# Patient Record
Sex: FEMALE | Race: OTHER | HISPANIC OR LATINO | URBAN - METROPOLITAN AREA
[De-identification: names, ages, dates, MRNs, and addresses within clinical notes are randomized per-mention and may not be internally consistent; named-entity substitution may affect disease eponyms.]

---

## 2018-04-02 ENCOUNTER — EMERGENCY (EMERGENCY)
Facility: HOSPITAL | Age: 38
LOS: 1 days | Discharge: HOME | End: 2018-04-02
Attending: EMERGENCY MEDICINE
Payer: MEDICAID

## 2018-04-02 VITALS
SYSTOLIC BLOOD PRESSURE: 99 MMHG | OXYGEN SATURATION: 100 % | TEMPERATURE: 97 F | RESPIRATION RATE: 18 BRPM | DIASTOLIC BLOOD PRESSURE: 66 MMHG | HEART RATE: 63 BPM

## 2018-04-02 VITALS
OXYGEN SATURATION: 98 % | DIASTOLIC BLOOD PRESSURE: 75 MMHG | HEIGHT: 64 IN | WEIGHT: 149.03 LBS | RESPIRATION RATE: 16 BRPM | HEART RATE: 65 BPM | TEMPERATURE: 98 F | SYSTOLIC BLOOD PRESSURE: 115 MMHG

## 2018-04-02 PROBLEM — Z00.00 ENCOUNTER FOR PREVENTIVE HEALTH EXAMINATION: Status: ACTIVE | Noted: 2018-04-02

## 2018-04-02 LAB — HCG UR QL: NEGATIVE — SIGNIFICANT CHANGE UP

## 2018-04-02 PROCEDURE — 71046 X-RAY EXAM CHEST 2 VIEWS: CPT

## 2018-04-02 PROCEDURE — 81025 URINE PREGNANCY TEST: CPT

## 2018-04-02 PROCEDURE — 99284 EMERGENCY DEPT VISIT MOD MDM: CPT

## 2018-04-02 PROCEDURE — 99283 EMERGENCY DEPT VISIT LOW MDM: CPT | Mod: 25

## 2018-04-02 PROCEDURE — 71046 X-RAY EXAM CHEST 2 VIEWS: CPT | Mod: 26

## 2018-04-02 PROCEDURE — 93005 ELECTROCARDIOGRAM TRACING: CPT

## 2018-04-02 RX ORDER — IBUPROFEN 200 MG
800 TABLET ORAL ONCE
Qty: 0 | Refills: 0 | Status: COMPLETED | OUTPATIENT
Start: 2018-04-02 | End: 2018-04-02

## 2018-04-02 RX ADMIN — Medication 800 MILLIGRAM(S): at 21:53

## 2018-04-02 NOTE — ED PROVIDER NOTE - PROGRESS NOTE DETAILS
Audrey LOPEZ: Patient reassessed and reports feeling better. On exam her L chest/breast is nontender. Patient will follow up with her PMD in 3 days.

## 2018-04-02 NOTE — ED PROVIDER NOTE - OBJECTIVE STATEMENT
Gurjit LOPEZ: 36 y/o female w/o PMH here with L breast pain. Patient reports one day of pain in her breast that is severe and acute. Pain is not worsened or improved by anything. Patient denies fever, cough, palpitations, BELLAMY, Orthopnea, back pain, paresthesias, LE edema, travel or hemoptysis. Exam shows a female in NAD with clear lungs and cardiac S1S2 ntoed, RRR. Abd soft and nontender and nondistended. L breast without galactorrhea and no LAD noted in the axillary area or supraclavicular region. Skin overlying her breast is normal and w/o erythema. Consider CAP, Connective tissue disease, Muscular pain. Plan EKG, CXR and pain control and reassess.

## 2019-11-27 ENCOUNTER — EMERGENCY (EMERGENCY)
Facility: HOSPITAL | Age: 39
LOS: 1 days | Discharge: HOME | End: 2019-11-27
Attending: EMERGENCY MEDICINE
Payer: MEDICAID

## 2019-11-27 VITALS
RESPIRATION RATE: 17 BRPM | HEIGHT: 64 IN | WEIGHT: 145.95 LBS | DIASTOLIC BLOOD PRESSURE: 79 MMHG | HEART RATE: 81 BPM | TEMPERATURE: 98 F | SYSTOLIC BLOOD PRESSURE: 114 MMHG | OXYGEN SATURATION: 98 %

## 2019-11-27 PROCEDURE — 99285 EMERGENCY DEPT VISIT HI MDM: CPT

## 2019-11-27 RX ORDER — SODIUM CHLORIDE 9 MG/ML
1000 INJECTION INTRAMUSCULAR; INTRAVENOUS; SUBCUTANEOUS ONCE
Refills: 0 | Status: COMPLETED | OUTPATIENT
Start: 2019-11-27 | End: 2019-11-27

## 2019-11-27 RX ORDER — KETOROLAC TROMETHAMINE 30 MG/ML
30 SYRINGE (ML) INJECTION ONCE
Refills: 0 | Status: DISCONTINUED | OUTPATIENT
Start: 2019-11-27 | End: 2019-11-27

## 2019-11-28 VITALS
RESPIRATION RATE: 16 BRPM | HEART RATE: 74 BPM | OXYGEN SATURATION: 98 % | DIASTOLIC BLOOD PRESSURE: 54 MMHG | SYSTOLIC BLOOD PRESSURE: 100 MMHG | TEMPERATURE: 98 F

## 2019-11-28 LAB
ALBUMIN SERPL ELPH-MCNC: 3.3 G/DL — LOW (ref 3.5–5)
ALP SERPL-CCNC: 58 U/L — SIGNIFICANT CHANGE UP (ref 40–120)
ALT FLD-CCNC: 28 U/L DA — SIGNIFICANT CHANGE UP (ref 10–60)
ANION GAP SERPL CALC-SCNC: 4 MMOL/L — LOW (ref 5–17)
APPEARANCE UR: CLEAR — SIGNIFICANT CHANGE UP
APTT BLD: 32 SEC — SIGNIFICANT CHANGE UP (ref 27.5–36.3)
AST SERPL-CCNC: 14 U/L — SIGNIFICANT CHANGE UP (ref 10–40)
BASOPHILS # BLD AUTO: 0.01 K/UL — SIGNIFICANT CHANGE UP (ref 0–0.2)
BASOPHILS NFR BLD AUTO: 0.1 % — SIGNIFICANT CHANGE UP (ref 0–2)
BILIRUB SERPL-MCNC: 0.3 MG/DL — SIGNIFICANT CHANGE UP (ref 0.2–1.2)
BILIRUB UR-MCNC: NEGATIVE — SIGNIFICANT CHANGE UP
BUN SERPL-MCNC: 9 MG/DL — SIGNIFICANT CHANGE UP (ref 7–18)
CALCIUM SERPL-MCNC: 8.6 MG/DL — SIGNIFICANT CHANGE UP (ref 8.4–10.5)
CHLORIDE SERPL-SCNC: 108 MMOL/L — SIGNIFICANT CHANGE UP (ref 96–108)
CO2 SERPL-SCNC: 27 MMOL/L — SIGNIFICANT CHANGE UP (ref 22–31)
COLOR SPEC: YELLOW — SIGNIFICANT CHANGE UP
CREAT SERPL-MCNC: 0.67 MG/DL — SIGNIFICANT CHANGE UP (ref 0.5–1.3)
DIFF PNL FLD: NEGATIVE — SIGNIFICANT CHANGE UP
EOSINOPHIL # BLD AUTO: 0.05 K/UL — SIGNIFICANT CHANGE UP (ref 0–0.5)
EOSINOPHIL NFR BLD AUTO: 0.6 % — SIGNIFICANT CHANGE UP (ref 0–6)
GLUCOSE SERPL-MCNC: 90 MG/DL — SIGNIFICANT CHANGE UP (ref 70–99)
GLUCOSE UR QL: NEGATIVE — SIGNIFICANT CHANGE UP
HCG SERPL-ACNC: 1060 MIU/ML — HIGH
HCG UR QL: POSITIVE
HCT VFR BLD CALC: 35.9 % — SIGNIFICANT CHANGE UP (ref 34.5–45)
HGB BLD-MCNC: 11.8 G/DL — SIGNIFICANT CHANGE UP (ref 11.5–15.5)
IMM GRANULOCYTES NFR BLD AUTO: 0.2 % — SIGNIFICANT CHANGE UP (ref 0–1.5)
INR BLD: 1.04 RATIO — SIGNIFICANT CHANGE UP (ref 0.88–1.16)
KETONES UR-MCNC: NEGATIVE — SIGNIFICANT CHANGE UP
LACTATE SERPL-SCNC: 1.2 MMOL/L — SIGNIFICANT CHANGE UP (ref 0.7–2)
LEUKOCYTE ESTERASE UR-ACNC: NEGATIVE — SIGNIFICANT CHANGE UP
LYMPHOCYTES # BLD AUTO: 3.11 K/UL — SIGNIFICANT CHANGE UP (ref 1–3.3)
LYMPHOCYTES # BLD AUTO: 36.4 % — SIGNIFICANT CHANGE UP (ref 13–44)
MCHC RBC-ENTMCNC: 29.1 PG — SIGNIFICANT CHANGE UP (ref 27–34)
MCHC RBC-ENTMCNC: 32.9 GM/DL — SIGNIFICANT CHANGE UP (ref 32–36)
MCV RBC AUTO: 88.4 FL — SIGNIFICANT CHANGE UP (ref 80–100)
MONOCYTES # BLD AUTO: 0.61 K/UL — SIGNIFICANT CHANGE UP (ref 0–0.9)
MONOCYTES NFR BLD AUTO: 7.1 % — SIGNIFICANT CHANGE UP (ref 2–14)
NEUTROPHILS # BLD AUTO: 4.75 K/UL — SIGNIFICANT CHANGE UP (ref 1.8–7.4)
NEUTROPHILS NFR BLD AUTO: 55.6 % — SIGNIFICANT CHANGE UP (ref 43–77)
NITRITE UR-MCNC: NEGATIVE — SIGNIFICANT CHANGE UP
NRBC # BLD: 0 /100 WBCS — SIGNIFICANT CHANGE UP (ref 0–0)
PH UR: 8 — SIGNIFICANT CHANGE UP (ref 5–8)
PLATELET # BLD AUTO: 236 K/UL — SIGNIFICANT CHANGE UP (ref 150–400)
POTASSIUM SERPL-MCNC: 3.4 MMOL/L — LOW (ref 3.5–5.3)
POTASSIUM SERPL-SCNC: 3.4 MMOL/L — LOW (ref 3.5–5.3)
PROT SERPL-MCNC: 6.6 G/DL — SIGNIFICANT CHANGE UP (ref 6–8.3)
PROT UR-MCNC: NEGATIVE — SIGNIFICANT CHANGE UP
PROTHROM AB SERPL-ACNC: 11.6 SEC — SIGNIFICANT CHANGE UP (ref 10–12.9)
RBC # BLD: 4.06 M/UL — SIGNIFICANT CHANGE UP (ref 3.8–5.2)
RBC # FLD: 13.1 % — SIGNIFICANT CHANGE UP (ref 10.3–14.5)
SODIUM SERPL-SCNC: 139 MMOL/L — SIGNIFICANT CHANGE UP (ref 135–145)
SP GR SPEC: 1.01 — SIGNIFICANT CHANGE UP (ref 1.01–1.02)
TROPONIN I SERPL-MCNC: <0.015 NG/ML — SIGNIFICANT CHANGE UP (ref 0–0.04)
UROBILINOGEN FLD QL: NEGATIVE — SIGNIFICANT CHANGE UP
WBC # BLD: 8.55 K/UL — SIGNIFICANT CHANGE UP (ref 3.8–10.5)
WBC # FLD AUTO: 8.55 K/UL — SIGNIFICANT CHANGE UP (ref 3.8–10.5)

## 2019-11-28 PROCEDURE — 85610 PROTHROMBIN TIME: CPT

## 2019-11-28 PROCEDURE — 83605 ASSAY OF LACTIC ACID: CPT

## 2019-11-28 PROCEDURE — 96361 HYDRATE IV INFUSION ADD-ON: CPT

## 2019-11-28 PROCEDURE — 76856 US EXAM PELVIC COMPLETE: CPT

## 2019-11-28 PROCEDURE — 86850 RBC ANTIBODY SCREEN: CPT

## 2019-11-28 PROCEDURE — 99284 EMERGENCY DEPT VISIT MOD MDM: CPT | Mod: 25

## 2019-11-28 PROCEDURE — 86900 BLOOD TYPING SEROLOGIC ABO: CPT

## 2019-11-28 PROCEDURE — 84484 ASSAY OF TROPONIN QUANT: CPT

## 2019-11-28 PROCEDURE — 87086 URINE CULTURE/COLONY COUNT: CPT

## 2019-11-28 PROCEDURE — 86901 BLOOD TYPING SEROLOGIC RH(D): CPT

## 2019-11-28 PROCEDURE — 84702 CHORIONIC GONADOTROPIN TEST: CPT

## 2019-11-28 PROCEDURE — 81003 URINALYSIS AUTO W/O SCOPE: CPT

## 2019-11-28 PROCEDURE — 76830 TRANSVAGINAL US NON-OB: CPT | Mod: 26

## 2019-11-28 PROCEDURE — 36415 COLL VENOUS BLD VENIPUNCTURE: CPT

## 2019-11-28 PROCEDURE — 85730 THROMBOPLASTIN TIME PARTIAL: CPT

## 2019-11-28 PROCEDURE — 80053 COMPREHEN METABOLIC PANEL: CPT

## 2019-11-28 PROCEDURE — 85027 COMPLETE CBC AUTOMATED: CPT

## 2019-11-28 PROCEDURE — 81025 URINE PREGNANCY TEST: CPT

## 2019-11-28 PROCEDURE — 76830 TRANSVAGINAL US NON-OB: CPT

## 2019-11-28 PROCEDURE — 76856 US EXAM PELVIC COMPLETE: CPT | Mod: 26

## 2019-11-28 PROCEDURE — 82962 GLUCOSE BLOOD TEST: CPT

## 2019-11-28 PROCEDURE — 96374 THER/PROPH/DIAG INJ IV PUSH: CPT

## 2019-11-28 PROCEDURE — 96375 TX/PRO/DX INJ NEW DRUG ADDON: CPT

## 2019-11-28 PROCEDURE — 93005 ELECTROCARDIOGRAM TRACING: CPT

## 2019-11-28 RX ORDER — MECLIZINE HCL 12.5 MG
1 TABLET ORAL
Qty: 9 | Refills: 0
Start: 2019-11-28 | End: 2019-11-30

## 2019-11-28 RX ORDER — MECLIZINE HCL 12.5 MG
50 TABLET ORAL ONCE
Refills: 0 | Status: COMPLETED | OUTPATIENT
Start: 2019-11-28 | End: 2019-11-28

## 2019-11-28 RX ORDER — ONDANSETRON 8 MG/1
4 TABLET, FILM COATED ORAL ONCE
Refills: 0 | Status: COMPLETED | OUTPATIENT
Start: 2019-11-28 | End: 2019-11-28

## 2019-11-28 RX ORDER — ACETAMINOPHEN 500 MG
1000 TABLET ORAL ONCE
Refills: 0 | Status: COMPLETED | OUTPATIENT
Start: 2019-11-28 | End: 2019-11-28

## 2019-11-28 RX ADMIN — SODIUM CHLORIDE 1000 MILLILITER(S): 9 INJECTION INTRAMUSCULAR; INTRAVENOUS; SUBCUTANEOUS at 00:11

## 2019-11-28 RX ADMIN — Medication 50 MILLIGRAM(S): at 02:26

## 2019-11-28 RX ADMIN — SODIUM CHLORIDE 1000 MILLILITER(S): 9 INJECTION INTRAMUSCULAR; INTRAVENOUS; SUBCUTANEOUS at 02:24

## 2019-11-28 RX ADMIN — Medication 400 MILLIGRAM(S): at 02:28

## 2019-11-28 RX ADMIN — ONDANSETRON 4 MILLIGRAM(S): 8 TABLET, FILM COATED ORAL at 02:26

## 2019-11-28 NOTE — ED PROVIDER NOTE - CLINICAL SUMMARY MEDICAL DECISION MAKING FREE TEXT BOX
39 year old female with abd pain and dizziness. vitals WNL. PE as above.  labs are significant for elevated HCG. US pelvis without LIUP, but likely early pregnancy. pain in ED resolved with tylenol. advised to return to the ED in 2 days for repeat US and HCG. return precautions given.

## 2019-11-28 NOTE — ED PROVIDER NOTE - PATIENT PORTAL LINK FT
You can access the FollowMyHealth Patient Portal offered by [facility] by registering at the following website: [email or web address]. By joining [employer]’s FollowMyHealth portal, you will also be able to view your health information using other applications (apps) compatible with our system.

## 2019-11-28 NOTE — ED ADULT NURSE NOTE - NSIMPLEMENTINTERV_GEN_ALL_ED
Implemented All Universal Safety Interventions:  [place] to call system. Call bell, personal items and telephone within reach. Instruct patient to call for assistance. Room bathroom lighting operational. Non-slip footwear when patient is off stretcher. Physically safe environment: no spills, clutter or unnecessary equipment. Stretcher in lowest position, wheels locked, appropriate side rails in place.

## 2019-11-28 NOTE — ED PROVIDER NOTE - OBJECTIVE STATEMENT
39 year old female denies PMH coming in with lower abd pains, ha, and dizziness for the past few days. states today pains were worse and she had episode of syncope, states no trauma or fall and was in bed when it happened. States dizziness worse with movement. Denies fevers, chills, sweats, cp, sob, palpitations, back pains, V/D/C< urinary complaints, vaginal bleeding, vaginal discharge.

## 2019-11-29 LAB
CULTURE RESULTS: SIGNIFICANT CHANGE UP
SPECIMEN SOURCE: SIGNIFICANT CHANGE UP

## 2019-11-30 ENCOUNTER — EMERGENCY (EMERGENCY)
Facility: HOSPITAL | Age: 39
LOS: 1 days | Discharge: HOME | End: 2019-11-30
Attending: EMERGENCY MEDICINE
Payer: MEDICAID

## 2019-11-30 VITALS
RESPIRATION RATE: 20 BRPM | SYSTOLIC BLOOD PRESSURE: 110 MMHG | DIASTOLIC BLOOD PRESSURE: 68 MMHG | WEIGHT: 145.95 LBS | TEMPERATURE: 97 F | OXYGEN SATURATION: 99 % | HEIGHT: 64 IN | HEART RATE: 95 BPM

## 2019-11-30 VITALS — DIASTOLIC BLOOD PRESSURE: 53 MMHG | SYSTOLIC BLOOD PRESSURE: 93 MMHG

## 2019-11-30 LAB — HCG SERPL-ACNC: 2139 MIU/ML — HIGH

## 2019-11-30 PROCEDURE — 76815 OB US LIMITED FETUS(S): CPT | Mod: 26

## 2019-11-30 PROCEDURE — 76801 OB US < 14 WKS SINGLE FETUS: CPT

## 2019-11-30 PROCEDURE — 76817 TRANSVAGINAL US OBSTETRIC: CPT

## 2019-11-30 PROCEDURE — 99284 EMERGENCY DEPT VISIT MOD MDM: CPT

## 2019-11-30 PROCEDURE — 84702 CHORIONIC GONADOTROPIN TEST: CPT

## 2019-11-30 PROCEDURE — 76801 OB US < 14 WKS SINGLE FETUS: CPT | Mod: 26

## 2019-11-30 PROCEDURE — 36415 COLL VENOUS BLD VENIPUNCTURE: CPT

## 2019-11-30 PROCEDURE — 99284 EMERGENCY DEPT VISIT MOD MDM: CPT | Mod: 25

## 2019-11-30 PROCEDURE — 76817 TRANSVAGINAL US OBSTETRIC: CPT | Mod: 26

## 2019-11-30 RX ORDER — MECLIZINE HCL 12.5 MG
1 TABLET ORAL
Qty: 9 | Refills: 0
Start: 2019-11-30 | End: 2019-12-02

## 2019-11-30 RX ORDER — MECLIZINE HCL 12.5 MG
25 TABLET ORAL ONCE
Refills: 0 | Status: COMPLETED | OUTPATIENT
Start: 2019-11-30 | End: 2019-11-30

## 2019-11-30 NOTE — ED ADULT NURSE NOTE - NSIMPLEMENTINTERV_GEN_ALL_ED
Implemented All Universal Safety Interventions:  [place] to call system. Call bell, personal items and telephone within reach. Instruct patient to call for assistance. Room bathroom lighting operational. Non-slip footwear when patient is off stretcher. Physically safe environment: no spills, clutter or unnecessary equipment. Stretcher in lowest position, wheels locked, appropriate side rails in place.

## 2019-11-30 NOTE — ED ADULT NURSE NOTE - OBJECTIVE STATEMENT
Patient came to the ED a/o x 3 ambulates sent back for repeat hcg. Complains of slight dizziness, no CP/ SOB.

## 2019-11-30 NOTE — ED PROVIDER NOTE - CLINICAL SUMMARY MEDICAL DECISION MAKING FREE TEXT BOX
Patient here for repeat ACG check. 2 days ago was 1060 with ultrasound showing no gestational sac or IUP. Will repeat ACG to rule out ectopic vs early vs miss. Will contact GYN for follow-up.

## 2019-11-30 NOTE — ED PROVIDER NOTE - PATIENT PORTAL LINK FT
You can access the FollowMyHealth Patient Portal offered by [facility] by registering at the following website: [email or web address]. By joining [employer]’s FollowMyHealth portal, you will also be able to view your health information using other applications (apps) compatible with our system.

## 2019-11-30 NOTE — ED PROVIDER NOTE - PROGRESS NOTE DETAILS
Parsons: sono shows likely early gestational sac in uterus. and hcg double.  spoke with ob and rec to follow up with clinic number [phone]

## 2019-11-30 NOTE — ED PROVIDER NOTE - OBJECTIVE STATEMENT
39 year old female with PMHx of gastritis and no pertinent PSHx presents to the ED with complaints of dizziness and improved mid lower pelvic pain. Patient reports that two days ago she was seen in this hospital, and was found to be pregnant. Patient states that she wants to keep the pregnancy, and was not given prenatal vitamins during her previous visit here. Patient describes her dizziness as a room-spinning sensation. Patient is requesting to transfer care to this hospital, and to find a OB-GYN here. Patient otherwise denies any vaginal bleeding and all other acute complaints. NKDA.

## 2019-12-10 PROBLEM — K29.70 GASTRITIS, UNSPECIFIED, WITHOUT BLEEDING: Chronic | Status: ACTIVE | Noted: 2019-11-30

## 2019-12-17 ENCOUNTER — OUTPATIENT (OUTPATIENT)
Dept: OUTPATIENT SERVICES | Facility: HOSPITAL | Age: 39
LOS: 1 days | End: 2019-12-17
Payer: MEDICAID

## 2019-12-17 ENCOUNTER — NON-APPOINTMENT (OUTPATIENT)
Age: 39
End: 2019-12-17

## 2019-12-17 ENCOUNTER — RESULT REVIEW (OUTPATIENT)
Age: 39
End: 2019-12-17

## 2019-12-17 ENCOUNTER — APPOINTMENT (OUTPATIENT)
Dept: OBGYN | Facility: CLINIC | Age: 39
End: 2019-12-17
Payer: MEDICAID

## 2019-12-17 VITALS
SYSTOLIC BLOOD PRESSURE: 97 MMHG | BODY MASS INDEX: 24.75 KG/M2 | DIASTOLIC BLOOD PRESSURE: 65 MMHG | WEIGHT: 145 LBS | HEIGHT: 64 IN

## 2019-12-17 DIAGNOSIS — Z34.00 ENCOUNTER FOR SUPERVISION OF NORMAL FIRST PREGNANCY, UNSPECIFIED TRIMESTER: ICD-10-CM

## 2019-12-17 DIAGNOSIS — O09.519 SUPERVISION OF ELDERLY PRIMIGRAVIDA, UNSPECIFIED TRIMESTER: ICD-10-CM

## 2019-12-17 DIAGNOSIS — O99.280 ENDOCRINE, NUTRITIONAL AND METABOLIC DISEASES COMPLICATING PREGNANCY, UNSPECIFIED TRIMESTER: ICD-10-CM

## 2019-12-17 DIAGNOSIS — Z78.9 OTHER SPECIFIED HEALTH STATUS: ICD-10-CM

## 2019-12-17 DIAGNOSIS — E03.9 ENDOCRINE, NUTRITIONAL AND METABOLIC DISEASES COMPLICATING PREGNANCY, UNSPECIFIED TRIMESTER: ICD-10-CM

## 2019-12-17 DIAGNOSIS — Z3A.08 8 WEEKS GESTATION OF PREGNANCY: ICD-10-CM

## 2019-12-17 DIAGNOSIS — Z11.3 ENCOUNTER FOR SCREENING FOR INFECTIONS WITH A PREDOMINANTLY SEXUAL MODE OF TRANSMISSION: ICD-10-CM

## 2019-12-17 DIAGNOSIS — Z34.91 ENCOUNTER FOR SUPERVISION OF NORMAL PREGNANCY, UNSPECIFIED, FIRST TRIMESTER: ICD-10-CM

## 2019-12-17 DIAGNOSIS — Z80.3 FAMILY HISTORY OF MALIGNANT NEOPLASM OF BREAST: ICD-10-CM

## 2019-12-17 PROCEDURE — 87389 HIV-1 AG W/HIV-1&-2 AB AG IA: CPT

## 2019-12-17 PROCEDURE — 81220 CFTR GENE COM VARIANTS: CPT

## 2019-12-17 PROCEDURE — 87086 URINE CULTURE/COLONY COUNT: CPT

## 2019-12-17 PROCEDURE — 83655 ASSAY OF LEAD: CPT

## 2019-12-17 PROCEDURE — 81025 URINE PREGNANCY TEST: CPT

## 2019-12-17 PROCEDURE — 87624 HPV HI-RISK TYP POOLED RSLT: CPT

## 2019-12-17 PROCEDURE — 86803 HEPATITIS C AB TEST: CPT

## 2019-12-17 PROCEDURE — 84439 ASSAY OF FREE THYROXINE: CPT

## 2019-12-17 PROCEDURE — 83036 HEMOGLOBIN GLYCOSYLATED A1C: CPT

## 2019-12-17 PROCEDURE — 86901 BLOOD TYPING SEROLOGIC RH(D): CPT

## 2019-12-17 PROCEDURE — 83020 HEMOGLOBIN ELECTROPHORESIS: CPT

## 2019-12-17 PROCEDURE — 86850 RBC ANTIBODY SCREEN: CPT

## 2019-12-17 PROCEDURE — 86762 RUBELLA ANTIBODY: CPT

## 2019-12-17 PROCEDURE — 86900 BLOOD TYPING SEROLOGIC ABO: CPT

## 2019-12-17 PROCEDURE — 84481 FREE ASSAY (FT-3): CPT

## 2019-12-17 PROCEDURE — 81003 URINALYSIS AUTO W/O SCOPE: CPT

## 2019-12-17 PROCEDURE — 99203 OFFICE O/P NEW LOW 30 MIN: CPT

## 2019-12-17 PROCEDURE — 86787 VARICELLA-ZOSTER ANTIBODY: CPT

## 2019-12-17 PROCEDURE — 86780 TREPONEMA PALLIDUM: CPT

## 2019-12-17 PROCEDURE — 86480 TB TEST CELL IMMUN MEASURE: CPT

## 2019-12-17 PROCEDURE — 87340 HEPATITIS B SURFACE AG IA: CPT

## 2019-12-17 PROCEDURE — G0463: CPT

## 2019-12-17 PROCEDURE — 84443 ASSAY THYROID STIM HORMONE: CPT

## 2019-12-17 PROCEDURE — 83020 HEMOGLOBIN ELECTROPHORESIS: CPT | Mod: 26

## 2019-12-17 PROCEDURE — 86765 RUBEOLA ANTIBODY: CPT

## 2019-12-17 RX ORDER — PNV/FERROUS SULFATE/FOLIC ACID 27-<0.5MG
TABLET ORAL
Refills: 0 | Status: COMPLETED | COMMUNITY

## 2019-12-18 DIAGNOSIS — Z11.3 ENCOUNTER FOR SCREENING FOR INFECTIONS WITH A PREDOMINANTLY SEXUAL MODE OF TRANSMISSION: ICD-10-CM

## 2019-12-18 DIAGNOSIS — Z3A.08 8 WEEKS GESTATION OF PREGNANCY: ICD-10-CM

## 2019-12-18 DIAGNOSIS — O09.519 SUPERVISION OF ELDERLY PRIMIGRAVIDA, UNSPECIFIED TRIMESTER: ICD-10-CM

## 2019-12-18 PROBLEM — O99.280 HYPOTHYROIDISM AFFECTING PREGNANCY, ANTEPARTUM: Status: ACTIVE | Noted: 2019-12-18

## 2019-12-18 LAB
APPEARANCE UR: CLEAR — SIGNIFICANT CHANGE UP
BASOPHILS # BLD AUTO: 0.01 K/UL — SIGNIFICANT CHANGE UP (ref 0–0.2)
BASOPHILS NFR BLD AUTO: 0.1 % — SIGNIFICANT CHANGE UP (ref 0–2)
BILIRUB UR-MCNC: NEGATIVE — SIGNIFICANT CHANGE UP
COLOR SPEC: SIGNIFICANT CHANGE UP
DIFF PNL FLD: NEGATIVE — SIGNIFICANT CHANGE UP
EOSINOPHIL # BLD AUTO: 0.01 K/UL — SIGNIFICANT CHANGE UP (ref 0–0.5)
EOSINOPHIL NFR BLD AUTO: 0.1 % — SIGNIFICANT CHANGE UP (ref 0–6)
GLUCOSE UR QL: NEGATIVE — SIGNIFICANT CHANGE UP
HBA1C BLD-MCNC: 5.3 % — SIGNIFICANT CHANGE UP (ref 4–5.6)
HBV SURFACE AG SER-ACNC: SIGNIFICANT CHANGE UP
HCT VFR BLD CALC: 39.2 % — SIGNIFICANT CHANGE UP (ref 34.5–45)
HCV AB S/CO SERPL IA: 0.15 S/CO — SIGNIFICANT CHANGE UP (ref 0–0.99)
HCV AB SERPL-IMP: SIGNIFICANT CHANGE UP
HGB BLD-MCNC: 12.8 G/DL — SIGNIFICANT CHANGE UP (ref 11.5–15.5)
HIV 1+2 AB+HIV1 P24 AG SERPL QL IA: SIGNIFICANT CHANGE UP
IMM GRANULOCYTES NFR BLD AUTO: 0.3 % — SIGNIFICANT CHANGE UP (ref 0–1.5)
KETONES UR-MCNC: NEGATIVE — SIGNIFICANT CHANGE UP
LEUKOCYTE ESTERASE UR-ACNC: NEGATIVE — SIGNIFICANT CHANGE UP
LYMPHOCYTES # BLD AUTO: 2.27 K/UL — SIGNIFICANT CHANGE UP (ref 1–3.3)
LYMPHOCYTES # BLD AUTO: 33.3 % — SIGNIFICANT CHANGE UP (ref 13–44)
MCHC RBC-ENTMCNC: 29.4 PG — SIGNIFICANT CHANGE UP (ref 27–34)
MCHC RBC-ENTMCNC: 32.7 GM/DL — SIGNIFICANT CHANGE UP (ref 32–36)
MCV RBC AUTO: 90.1 FL — SIGNIFICANT CHANGE UP (ref 80–100)
MEV IGG SER-ACNC: 132 AU/ML — SIGNIFICANT CHANGE UP
MEV IGG+IGM SER-IMP: POSITIVE — SIGNIFICANT CHANGE UP
MONOCYTES # BLD AUTO: 0.35 K/UL — SIGNIFICANT CHANGE UP (ref 0–0.9)
MONOCYTES NFR BLD AUTO: 5.1 % — SIGNIFICANT CHANGE UP (ref 2–14)
NEUTROPHILS # BLD AUTO: 4.15 K/UL — SIGNIFICANT CHANGE UP (ref 1.8–7.4)
NEUTROPHILS NFR BLD AUTO: 61.1 % — SIGNIFICANT CHANGE UP (ref 43–77)
NITRITE UR-MCNC: NEGATIVE — SIGNIFICANT CHANGE UP
PH UR: 6 — SIGNIFICANT CHANGE UP (ref 5–8)
PLATELET # BLD AUTO: 233 K/UL — SIGNIFICANT CHANGE UP (ref 150–400)
PROT UR-MCNC: SIGNIFICANT CHANGE UP
RBC # BLD: 4.35 M/UL — SIGNIFICANT CHANGE UP (ref 3.8–5.2)
RBC # FLD: 13.2 % — SIGNIFICANT CHANGE UP (ref 10.3–14.5)
RUBV IGG SER-ACNC: 1.9 INDEX — SIGNIFICANT CHANGE UP
RUBV IGG SER-IMP: POSITIVE — SIGNIFICANT CHANGE UP
SP GR SPEC: 1.02 — SIGNIFICANT CHANGE UP (ref 1.01–1.02)
T PALLIDUM AB TITR SER: NEGATIVE — SIGNIFICANT CHANGE UP
T3FREE SERPL-MCNC: 2.72 PG/ML — SIGNIFICANT CHANGE UP (ref 1.8–4.6)
T4 FREE SERPL-MCNC: 1 NG/DL — SIGNIFICANT CHANGE UP (ref 0.9–1.8)
TSH SERPL-MCNC: 4.79 UIU/ML — HIGH (ref 0.27–4.2)
UROBILINOGEN FLD QL: SIGNIFICANT CHANGE UP
VZV IGG FLD QL IA: 1484 INDEX — SIGNIFICANT CHANGE UP
VZV IGG FLD QL IA: POSITIVE — SIGNIFICANT CHANGE UP
WBC # BLD: 6.81 K/UL — SIGNIFICANT CHANGE UP (ref 3.8–10.5)
WBC # FLD AUTO: 6.81 K/UL — SIGNIFICANT CHANGE UP (ref 3.8–10.5)

## 2019-12-18 RX ORDER — LEVOTHYROXINE SODIUM 0.05 MG/1
50 TABLET ORAL DAILY
Qty: 30 | Refills: 6 | Status: ACTIVE | COMMUNITY
Start: 2019-12-18 | End: 1900-01-01

## 2019-12-18 RX ORDER — PRENATAL VIT NO.130/IRON/FOLIC 27MG-0.8MG
28-0.8 TABLET ORAL
Qty: 30 | Refills: 11 | Status: ACTIVE | COMMUNITY
Start: 2019-12-18 | End: 1900-01-01

## 2019-12-19 LAB
CULTURE RESULTS: NO GROWTH — SIGNIFICANT CHANGE UP
GAMMA INTERFERON BACKGROUND BLD IA-ACNC: 0.07 IU/ML — SIGNIFICANT CHANGE UP
HEMOGLOBIN INTERPRETATION: SIGNIFICANT CHANGE UP
HGB A MFR BLD: 97.4 % — SIGNIFICANT CHANGE UP (ref 95.8–98)
HGB A2 MFR BLD: 2.6 % — SIGNIFICANT CHANGE UP (ref 2–3.2)
LEAD BLD-MCNC: SIGNIFICANT CHANGE UP UG/DL (ref 0–4)
M TB IFN-G BLD-IMP: NEGATIVE — SIGNIFICANT CHANGE UP
M TB IFN-G CD4+ BCKGRND COR BLD-ACNC: -0.04 IU/ML — SIGNIFICANT CHANGE UP
M TB IFN-G CD4+CD8+ BCKGRND COR BLD-ACNC: -0.03 IU/ML — SIGNIFICANT CHANGE UP
QUANT TB PLUS MITOGEN MINUS NIL: 8.7 IU/ML — SIGNIFICANT CHANGE UP
SPECIMEN SOURCE: SIGNIFICANT CHANGE UP

## 2019-12-23 LAB — CYSTIC FIBROSIS EXPANDED PANEL: SIGNIFICANT CHANGE UP

## 2020-01-02 ENCOUNTER — APPOINTMENT (OUTPATIENT)
Dept: ANTEPARTUM | Facility: CLINIC | Age: 40
End: 2020-01-02
Payer: MEDICAID

## 2020-01-02 ENCOUNTER — ASOB RESULT (OUTPATIENT)
Age: 40
End: 2020-01-02

## 2020-01-02 PROCEDURE — 76801 OB US < 14 WKS SINGLE FETUS: CPT

## 2020-01-02 PROCEDURE — 76817 TRANSVAGINAL US OBSTETRIC: CPT

## 2020-01-09 ENCOUNTER — EMERGENCY (EMERGENCY)
Facility: HOSPITAL | Age: 40
LOS: 1 days | Discharge: HOME | End: 2020-01-09
Attending: EMERGENCY MEDICINE
Payer: MEDICAID

## 2020-01-09 VITALS
DIASTOLIC BLOOD PRESSURE: 72 MMHG | HEART RATE: 92 BPM | WEIGHT: 145.95 LBS | TEMPERATURE: 98 F | SYSTOLIC BLOOD PRESSURE: 103 MMHG | HEIGHT: 64 IN | RESPIRATION RATE: 18 BRPM | OXYGEN SATURATION: 99 %

## 2020-01-09 LAB
ALBUMIN SERPL ELPH-MCNC: 3.5 G/DL — SIGNIFICANT CHANGE UP (ref 3.5–5)
ALP SERPL-CCNC: 63 U/L — SIGNIFICANT CHANGE UP (ref 40–120)
ALT FLD-CCNC: 27 U/L DA — SIGNIFICANT CHANGE UP (ref 10–60)
ANION GAP SERPL CALC-SCNC: 6 MMOL/L — SIGNIFICANT CHANGE UP (ref 5–17)
APPEARANCE UR: CLEAR — SIGNIFICANT CHANGE UP
AST SERPL-CCNC: 16 U/L — SIGNIFICANT CHANGE UP (ref 10–40)
BACTERIA # UR AUTO: ABNORMAL /HPF
BASOPHILS # BLD AUTO: 0.01 K/UL — SIGNIFICANT CHANGE UP (ref 0–0.2)
BASOPHILS NFR BLD AUTO: 0.1 % — SIGNIFICANT CHANGE UP (ref 0–2)
BILIRUB SERPL-MCNC: 0.2 MG/DL — SIGNIFICANT CHANGE UP (ref 0.2–1.2)
BILIRUB UR-MCNC: NEGATIVE — SIGNIFICANT CHANGE UP
BUN SERPL-MCNC: 11 MG/DL — SIGNIFICANT CHANGE UP (ref 7–18)
CALCIUM SERPL-MCNC: 8.3 MG/DL — LOW (ref 8.4–10.5)
CHLORIDE SERPL-SCNC: 109 MMOL/L — HIGH (ref 96–108)
CO2 SERPL-SCNC: 27 MMOL/L — SIGNIFICANT CHANGE UP (ref 22–31)
COLOR SPEC: YELLOW — SIGNIFICANT CHANGE UP
CREAT SERPL-MCNC: 0.76 MG/DL — SIGNIFICANT CHANGE UP (ref 0.5–1.3)
DIFF PNL FLD: ABNORMAL
EOSINOPHIL # BLD AUTO: 0.03 K/UL — SIGNIFICANT CHANGE UP (ref 0–0.5)
EOSINOPHIL NFR BLD AUTO: 0.4 % — SIGNIFICANT CHANGE UP (ref 0–6)
EPI CELLS # UR: ABNORMAL /HPF
GLUCOSE SERPL-MCNC: 83 MG/DL — SIGNIFICANT CHANGE UP (ref 70–99)
GLUCOSE UR QL: NEGATIVE — SIGNIFICANT CHANGE UP
HCG SERPL-ACNC: SIGNIFICANT CHANGE UP MIU/ML
HCT VFR BLD CALC: 36.8 % — SIGNIFICANT CHANGE UP (ref 34.5–45)
HGB BLD-MCNC: 12.1 G/DL — SIGNIFICANT CHANGE UP (ref 11.5–15.5)
IMM GRANULOCYTES NFR BLD AUTO: 0.1 % — SIGNIFICANT CHANGE UP (ref 0–1.5)
KETONES UR-MCNC: NEGATIVE — SIGNIFICANT CHANGE UP
LEUKOCYTE ESTERASE UR-ACNC: NEGATIVE — SIGNIFICANT CHANGE UP
LYMPHOCYTES # BLD AUTO: 2.13 K/UL — SIGNIFICANT CHANGE UP (ref 1–3.3)
LYMPHOCYTES # BLD AUTO: 28.2 % — SIGNIFICANT CHANGE UP (ref 13–44)
MCHC RBC-ENTMCNC: 29.3 PG — SIGNIFICANT CHANGE UP (ref 27–34)
MCHC RBC-ENTMCNC: 32.9 GM/DL — SIGNIFICANT CHANGE UP (ref 32–36)
MCV RBC AUTO: 89.1 FL — SIGNIFICANT CHANGE UP (ref 80–100)
MONOCYTES # BLD AUTO: 0.44 K/UL — SIGNIFICANT CHANGE UP (ref 0–0.9)
MONOCYTES NFR BLD AUTO: 5.8 % — SIGNIFICANT CHANGE UP (ref 2–14)
NEUTROPHILS # BLD AUTO: 4.92 K/UL — SIGNIFICANT CHANGE UP (ref 1.8–7.4)
NEUTROPHILS NFR BLD AUTO: 65.4 % — SIGNIFICANT CHANGE UP (ref 43–77)
NITRITE UR-MCNC: NEGATIVE — SIGNIFICANT CHANGE UP
NRBC # BLD: 0 /100 WBCS — SIGNIFICANT CHANGE UP (ref 0–0)
PH UR: 6.5 — SIGNIFICANT CHANGE UP (ref 5–8)
PLATELET # BLD AUTO: 236 K/UL — SIGNIFICANT CHANGE UP (ref 150–400)
POTASSIUM SERPL-MCNC: 3.7 MMOL/L — SIGNIFICANT CHANGE UP (ref 3.5–5.3)
POTASSIUM SERPL-SCNC: 3.7 MMOL/L — SIGNIFICANT CHANGE UP (ref 3.5–5.3)
PROT SERPL-MCNC: 6.9 G/DL — SIGNIFICANT CHANGE UP (ref 6–8.3)
PROT UR-MCNC: NEGATIVE — SIGNIFICANT CHANGE UP
RBC # BLD: 4.13 M/UL — SIGNIFICANT CHANGE UP (ref 3.8–5.2)
RBC # FLD: 12.9 % — SIGNIFICANT CHANGE UP (ref 10.3–14.5)
RBC CASTS # UR COMP ASSIST: ABNORMAL /HPF (ref 0–2)
SODIUM SERPL-SCNC: 142 MMOL/L — SIGNIFICANT CHANGE UP (ref 135–145)
SP GR SPEC: 1 — LOW (ref 1.01–1.02)
UROBILINOGEN FLD QL: NEGATIVE — SIGNIFICANT CHANGE UP
WBC # BLD: 7.54 K/UL — SIGNIFICANT CHANGE UP (ref 3.8–10.5)
WBC # FLD AUTO: 7.54 K/UL — SIGNIFICANT CHANGE UP (ref 3.8–10.5)
WBC UR QL: SIGNIFICANT CHANGE UP /HPF (ref 0–5)

## 2020-01-09 PROCEDURE — 99284 EMERGENCY DEPT VISIT MOD MDM: CPT | Mod: 25

## 2020-01-09 PROCEDURE — 76815 OB US LIMITED FETUS(S): CPT | Mod: 26

## 2020-01-09 PROCEDURE — 84702 CHORIONIC GONADOTROPIN TEST: CPT

## 2020-01-09 PROCEDURE — 99285 EMERGENCY DEPT VISIT HI MDM: CPT

## 2020-01-09 PROCEDURE — 81001 URINALYSIS AUTO W/SCOPE: CPT

## 2020-01-09 PROCEDURE — 76815 OB US LIMITED FETUS(S): CPT

## 2020-01-09 PROCEDURE — 76817 TRANSVAGINAL US OBSTETRIC: CPT | Mod: 26

## 2020-01-09 PROCEDURE — 85027 COMPLETE CBC AUTOMATED: CPT

## 2020-01-09 PROCEDURE — 76801 OB US < 14 WKS SINGLE FETUS: CPT | Mod: 26

## 2020-01-09 PROCEDURE — 76801 OB US < 14 WKS SINGLE FETUS: CPT

## 2020-01-09 PROCEDURE — 76817 TRANSVAGINAL US OBSTETRIC: CPT

## 2020-01-09 PROCEDURE — 80053 COMPREHEN METABOLIC PANEL: CPT

## 2020-01-09 PROCEDURE — 36415 COLL VENOUS BLD VENIPUNCTURE: CPT

## 2020-01-09 NOTE — ED PROVIDER NOTE - OBJECTIVE STATEMENT
ID# [id number]: 39 year-old female, history of hypothyroidism, , LMP 10/20/2019, presents with cc brown vaginal discharge with spotting and lower abdominal pain since this AM. Gradual onset, continuous, moderate, no alleviating or aggravating factors. Associated with nausea. Denies any fever, chills, viral symptoms, urinary symptoms, back pain or any other complaints. Was seen by OBGYN (unknwon name) on 2019 and was told that it was still early  to see anything on US and was referred for US on 2020.

## 2020-01-09 NOTE — ED PROVIDER NOTE - PROGRESS NOTE DETAILS
ID#[id number] (Lex): Based on LMP patient is 10 weeks pregnant. Based on US patient is 6 weeks pregnant and no FHR. Big discrepancy. Discussed that if patient is 10 weeks pregnant it is most likely missed . If it is in fact 6 weeks like US specify it may be early to see FHR. WIll need to follow up with OBGYN tomorrow. Rh+. No signs of infection or anemia. Pt is well appearing walking with steady gait, stable for discharge and follow up without fail with medical doctor. I had a detailed discussion with the patient and/or guardian regarding the historical points, exam findings, and any diagnostic results supporting the discharge diagnosis. Pt educated on care and need for follow up. Strict return instructions and red flag signs and symptoms discussed with patient. Questions answered. Pt shows understanding of discharge information and agrees to follow.

## 2020-01-09 NOTE — ED PROVIDER NOTE - NSFOLLOWUPINSTRUCTIONS_ED_ALL_ED_FT
[place] un seguimiento con el médico OB GYN mañana sin falta.   Si experimenta síntomas nuevos o que empeoran, puede volver a la leonor de emergencias.    Follow up with the OBGYN tomorrow  If you experience any new or worsening symptoms or if you are concerned you can always come back to the emergency for a re-evaluation.

## 2020-01-09 NOTE — ED PROVIDER NOTE - PATIENT PORTAL LINK FT
You can access the FollowMyHealth Patient Portal offered by [facility] by registering at the following website: [email or web address]. By joining [employer]’s FollowMyHealth portal, you will also be able to view your health information using other applications (apps) compatible with our system.

## 2020-01-09 NOTE — ED ADULT NURSE NOTE - GASTROINTESTINAL WDL
Abdomen soft, nontender, nondistended, bowel sounds present in all 4 quadrants.
report given to transfer center and EMTs

## 2020-01-10 ENCOUNTER — EMERGENCY (EMERGENCY)
Facility: HOSPITAL | Age: 40
LOS: 1 days | Discharge: HOME | End: 2020-01-10
Attending: EMERGENCY MEDICINE
Payer: MEDICAID

## 2020-01-10 ENCOUNTER — RESULT REVIEW (OUTPATIENT)
Age: 40
End: 2020-01-10

## 2020-01-10 VITALS
OXYGEN SATURATION: 99 % | HEART RATE: 81 BPM | RESPIRATION RATE: 18 BRPM | DIASTOLIC BLOOD PRESSURE: 66 MMHG | SYSTOLIC BLOOD PRESSURE: 104 MMHG | HEART RATE: 81 BPM | RESPIRATION RATE: 18 BRPM | TEMPERATURE: 98 F | OXYGEN SATURATION: 99 % | TEMPERATURE: 98 F | DIASTOLIC BLOOD PRESSURE: 66 MMHG | SYSTOLIC BLOOD PRESSURE: 104 MMHG

## 2020-01-10 VITALS
OXYGEN SATURATION: 98 % | HEART RATE: 97 BPM | TEMPERATURE: 98 F | RESPIRATION RATE: 20 BRPM | SYSTOLIC BLOOD PRESSURE: 111 MMHG | DIASTOLIC BLOOD PRESSURE: 72 MMHG | WEIGHT: 143.08 LBS | HEIGHT: 64 IN

## 2020-01-10 DIAGNOSIS — O03.9 COMPLETE OR UNSPECIFIED SPONTANEOUS ABORTION WITHOUT COMPLICATION: ICD-10-CM

## 2020-01-10 LAB
HCG SERPL-ACNC: HIGH MIU/ML
HCT VFR BLD CALC: 38.5 % — SIGNIFICANT CHANGE UP (ref 34.5–45)
HGB BLD-MCNC: 12.8 G/DL — SIGNIFICANT CHANGE UP (ref 11.5–15.5)

## 2020-01-10 PROCEDURE — 36415 COLL VENOUS BLD VENIPUNCTURE: CPT

## 2020-01-10 PROCEDURE — 88305 TISSUE EXAM BY PATHOLOGIST: CPT | Mod: 26

## 2020-01-10 PROCEDURE — 85014 HEMATOCRIT: CPT

## 2020-01-10 PROCEDURE — 88305 TISSUE EXAM BY PATHOLOGIST: CPT

## 2020-01-10 PROCEDURE — 85018 HEMOGLOBIN: CPT

## 2020-01-10 PROCEDURE — 84702 CHORIONIC GONADOTROPIN TEST: CPT

## 2020-01-10 PROCEDURE — 99284 EMERGENCY DEPT VISIT MOD MDM: CPT

## 2020-01-10 RX ORDER — MISOPROSTOL 200 UG/1
2 TABLET ORAL
Qty: 4 | Refills: 0
Start: 2020-01-10 | End: 2020-01-10

## 2020-01-10 NOTE — ED ADULT TRIAGE NOTE - CHIEF COMPLAINT QUOTE
Vag bleed x yesterday. Pt is 10 weeks pregnant Vag bleed x yesterday. Pt is 10 weeks pregnant. Pt was seen in ED x yesterday for same.

## 2020-01-10 NOTE — CONSULT NOTE ADULT - ASSESSMENT
a/p 38 yo F at 11weeks by LMP crl measuring 6w3d by sono on 1/6/2020,  c/o vaginal bleeding, suspected spontaneous ab, hemodynamically stable  -specimen sent to pathology  -Patient seen by Dr Ballard, house attending. Medical vs surgical management discussed with patient by Dr Ballard in patient's native tongue. Patient would like to procedure with medical management Patient instructed to take misoprostol 400mcg buccally q 3hours x 3 doses. Patient to take first dose in ED. Risk and benefits discussed and all questions answered by Dr Ballard. eRX sent. pain managment OTC tylenol or motrin  - Patient to f/u with DR Ballard on 1/13/2020. Information given.  - DR Santizo made aware of plan.

## 2020-01-10 NOTE — ED ADULT NURSE NOTE - NSIMPLEMENTINTERV_GEN_ALL_ED
Implemented All Universal Safety Interventions:  [place] to call system. Call bell, personal items and telephone within reach. Instruct patient to call for assistance. Room bathroom lighting operational. Non-slip footwear when patient is off stretcher. Physically safe environment: no spills, clutter or unnecessary equipment. Stretcher in lowest position, wheels locked, appropriate side rails in place.

## 2020-01-10 NOTE — ED PROVIDER NOTE - OBJECTIVE STATEMENT
Pt is a 39y F with significant PMHx of gastritis and no significant PSHx presenting to the ED with vaginal bleeding. Pt was seen here yesterday for vaginal bleeding and was diagnosed with a threatened . Pt is about 10 weeks pregnant with her 1st pregnancy. Pt reports after leaving the ED, she started bleeding more heavily and notes blood clots this morning. Pt's OB/GYN is Dr. Shi. Pt states she feels light headed when standing up. No palpitations, no chest pain, no abdominal pain, no shortness of breath, no nausea, no vomiting, no diarrhea. Pt has NKDA and currently denies any additional complaints at this time.

## 2020-01-10 NOTE — CONSULT NOTE ADULT - SUBJECTIVE AND OBJECTIVE BOX
39y  F  LMP presents  to [facility] ED with worsening vb, vaginal discharge; pelvic pain, abd pain, cp, sob, dizziness, palpitations, n/v/d/c, fever; chills     pob/gynhx: G P ; followed by Latanya DE LEÓN.   menarche, regular. Denies stds,  abn pap smears,  fibroids or   pmhx:  pshx:  all:  meds:   sochx: no etoh/drug/tobacco use    REVIEW OF SYSTEMS: see HPI	    PE:  Vital Signs Last 24 Hrs  T(C): 36.7 (10 Chase 2020 08:08), Max: 36.9 (2020 19:30)  T(F): 98 (10 Chase 2020 08:08), Max: 98.5 (2020 19:30)  HR: 97 (10 Chase 2020 08:08) (82 - 97)  BP: 111/72 (10 Chase 2020 08:08) (101/67 - 111/72)  BP(mean): --  RR: 20 (10 Chase 2020 08:08) (16 - 20)  SpO2: 98% (10 Chase 2020 08:08) (98% - 100%)  abd: +bs; soft, nt, nd, no rebound or guarding; no cvat b/l  pelvis: no cmt; no vaginal bleeding or abnormal discharge; no odor, closed/long, no uterine tenderness; uterus approx 8wks size regular in contour, mobile. No adnexal tenderness or masses appreciated b/l     LABS:                        12.8   x     )-----------( x        ( 10 Chase 2020 08:56 )             38.5         142  |  109<H>  |  11  ----------------------------<  83  3.7   |  27  |  0.76    Ca    8.3<L>      2020 16:58    TPro  6.9  /  Alb  3.5  /  TBili  0.2  /  DBili  x   /  AST  16  /  ALT  27  /  AlkPhos  63  01-09      Urinalysis Basic - ( 2020 16:58 )    Color: Yellow / Appearance: Clear / S.005 / pH: x  Gluc: x / Ketone: Negative  / Bili: Negative / Urobili: Negative   Blood: x / Protein: Negative / Nitrite: Negative   Leuk Esterase: Negative / RBC: 5-10 /HPF / WBC 0-2 /HPF   Sq Epi: x / Non Sq Epi: Occasional /HPF / Bacteria: Trace /HPF        RADIOLOGY & ADDITIONAL STUDIES:  sono  ct scan    a/p    -d/w  39y  F  LMP presents  to [facility] ED with worsening vaginal bleeding, vaginal discharge; pelvic pain, abd pain, cp, sob, dizziness, palpitations, n/v/d/c, fever; chills     pob/gynhx: G P ; followed by Latanya DE LEÓN.   menarche, regular. Denies stds,  abn pap smears,  fibroids or   pmhx:  pshx:  all:  meds:   sochx: no etoh/drug/tobacco use    REVIEW OF SYSTEMS: see HPI	    PE:  Vital Signs Last 24 Hrs  T(C): 36.7 (10 Chase 2020 08:08), Max: 36.9 (2020 19:30)  T(F): 98 (10 Chase 2020 08:08), Max: 98.5 (2020 19:30)  HR: 97 (10 Chase 2020 08:08) (82 - 97)  BP: 111/72 (10 Chase 2020 08:08) (101/67 - 111/72)  BP(mean): --  RR: 20 (10 Chase 2020 08:08) (16 - 20)  SpO2: 98% (10 Chase 2020 08:08) (98% - 100%)  abd: +bs; soft, nt, nd, no rebound or guarding; no cvat b/l  pelvis: no cmt; no vaginal bleeding or abnormal discharge; no odor, closed/long, no uterine tenderness; uterus approx 8wks size regular in contour, mobile. No adnexal tenderness or masses appreciated b/l     LABS:                        12.8   x     )-----------( x        ( 10 Chase 2020 08:56 )             38.5         142  |  109<H>  |  11  ----------------------------<  83  3.7   |  27  |  0.76    Ca    8.3<L>      2020 16:58    TPro  6.9  /  Alb  3.5  /  TBili  0.2  /  DBili  x   /  AST  16  /  ALT  27  /  AlkPhos  63  -09      Urinalysis Basic - ( 2020 16:58 )    Color: Yellow / Appearance: Clear / S.005 / pH: x  Gluc: x / Ketone: Negative  / Bili: Negative / Urobili: Negative   Blood: x / Protein: Negative / Nitrite: Negative   Leuk Esterase: Negative / RBC: 5-10 /HPF / WBC 0-2 /HPF   Sq Epi: x / Non Sq Epi: Occasional /HPF / Bacteria: Trace /HPF        RADIOLOGY & ADDITIONAL STUDIES:  sono  ct scan    a/p    -d/w  39y  F  LMP 10/20/2020 presents to [facility] ED with worsening vaginal bleeding and abdominal pain. Patient is Greenlandic speaking,  [employer] ID # [id number] used.  Patient was seen in ED yesterday with similar complaint. Ultrasound was done and noted crl measuring 0.64cm corresponding to 6weeks 3days. Patient was discharged home in stable condition. Patient denies cp, sob, dizziness, palpitations, n/v/d/c, fever; chills     pob/gynhx:  ; followed by Dr Shi OBJackson last visit 2019.  Also seen by [facility] Eli 2020 for fetal viability,  crl measured 6w2d, no FHR noted at that time.  Menarche age 11 , regular. Denies stds,  abn pap smears,  fibroids or   pmhx: gastritis, hypothyroidism  pshx: denies  all: nkda  meds: see medication reconciliation  sochx: no etoh/drug/tobacco use    REVIEW OF SYSTEMS: see HPI	    PE:  Vital Signs Last 24 Hrs  T(C): 36.7 (10 Chase 2020 08:08), Max: 36.9 (2020 19:30)  T(F): 98 (10 Chase 2020 08:08), Max: 98.5 (2020 19:30)  HR: 97 (10 Chase 2020 08:08) (82 - 97)  BP: 111/72 (10 Chase 2020 08:08) (101/67 - 111/72)  BP(mean): --  RR: 20 (10 Chase 2020 08:08) (16 - 20)  SpO2: 98% (10 Chase 2020 08:08) (98% - 100%)    gen:  emotionally distraught,   abd: +bs; soft, nt, nd, no rebound or guarding; no cvat b/l  sterile speculum exam: scant vaginal dark red blood noted, grape sized tissue noted in cervical os,  tissue removed with sterile forceps and placed in specimen container, no active bleeding noted after removal of tissue. no cmt, no abnormal discharge; no odor, closed/long, no uterine tenderness; uterus regular in contour, mobile. No adnexal tenderness or masses appreciated b/l     LABS:                        12.8   x     )-----------( x        ( 10 Chase 2020 08:56 )             38.5     01-09    142  |  109<H>  |  11  ----------------------------<  83  3.7   |  27  |  0.76    Ca    8.3<L>      2020 16:58    TPro  6.9  /  Alb  3.5  /  TBili  0.2  /  DBili  x   /  AST  16  /  ALT  27  /  AlkPhos  63        Urinalysis Basic - ( 2020 16:58 )    Color: Yellow / Appearance: Clear / S.005 / pH: x  Gluc: x / Ketone: Negative  / Bili: Negative / Urobili: Negative   Blood: x / Protein: Negative / Nitrite: Negative   Leuk Esterase: Negative / RBC: 5-10 /HPF / WBC 0-2 /HPF   Sq Epi: x / Non Sq Epi: Occasional /HPF / Bacteria: Trace /HPF        RADIOLOGY & ADDITIONAL STUDIES:  sono  < from: US Transvaginal, OB (20 @ 16:53) >    EXAM:  US OB TRANSVAGINAL                          EXAM:  US OB LES THAN 14 WKS 1ST GEST                            PROCEDURE DATE:  2020          INTERPRETATION:  Transabdominal and transvaginal obstetrical ultrasounds    Indication: Pregnant woman with abdominal pain.    Transabdominal and transvaginal obstetrical ultrasounds are performed and they are reported together. There is a single intrauterine gestation. The crown-rump length of the fetus measures 0.64 cm, corresponding to a gestational age of 6 weeks and 3 days. Expected date of delivery by ultrasound is 2020. No fetal cardiac activity is detected at this time, probably due to very gestation. No evidence for a uterine fibroid. Small nabothian cysts in the cervix which is otherwise grossly unremarkable. The cervix is closed.    The right ovary measures 3.4 x 2.1 x 3.5 cm and contains a 1.5 cm corpus luteum cyst. The left ovary measures 2.3 x 1.4 x 2.0 cm and appears grossly unremarkable. Duplex Doppler flow is demonstrated for both ovaries.    No pelvic free fluid.    Impression: Single intrauterine gestation. No fetal cardiac activity is detected at this time, probably due to very early gestation. If clinically indicated, follow-up obstetrical ultrasound may bepursued in 7-10 days to reassess viability.    < end of copied text >      a/p 40 yo F at 11weeks by LMP crl measuring 6w3d by sono on 2020,  c/o vaginal bleeding, suspected spontaneous ab, hemodynamically stable  -specimen sent to pathology  -Patient seen by Dr Ballard, house attending. Medical vs surgical management discussed with patient by Dr Ballard in patient's native tongue. Patient would like to procedure with medical management Patient instructed to take misoprostol 400mcg buccally q 3hours x 3 doses. Patient to take first dose in ED. Risk and benefits discussed and all questions answered by Dr Ballard. eRX sent. pain managment OTC tylenol or motrin  - Patient to f/u with DR Ballard on 2020. Information given.  - DR Santizo made aware of plan.

## 2020-01-10 NOTE — ED PROVIDER NOTE - PROGRESS NOTE DETAILS
Spoke to OB PA and asked if needed repeat ultrasound since pt had one done yesterday. PA said she would speak to the attending and follow up. Spoke to OB PA and she will come see pt. HCG dropping. Spoke to OB PA and she will come see pt. Spoke with OB again. Patient seen by Dr. Pena. Had POC on their pelvic exam. Will give buccal cytotec. Then d/c to f/u in office on Monday, 1/13 Patient is resting comfortably, NAD. Cytotec given. [employer] Slovenian used for all of my interactions with this patient.

## 2020-01-10 NOTE — ED PROVIDER NOTE - PATIENT PORTAL LINK FT
You can access the FollowMyHealth Patient Portal offered by [facility] by registering at the following website: [email or web address]. By joining [email or web address]’s [email or web address] portal, you will also be able to view your health information using other applications (apps) compatible with our system.

## 2020-01-13 ENCOUNTER — APPOINTMENT (OUTPATIENT)
Dept: OBGYN | Facility: CLINIC | Age: 40
End: 2020-01-13

## 2020-01-15 ENCOUNTER — APPOINTMENT (OUTPATIENT)
Dept: ANTEPARTUM | Facility: CLINIC | Age: 40
End: 2020-01-15

## 2020-01-31 NOTE — ED ADULT TRIAGE NOTE - ACCOMPANIED BY
[facility] Progress Note      DOS:   2020  NAME:  Eriberto Yanez   MRN:   [id number]   REFERRING PROVIDER:  Jeronimo Sapp NP  PRIMARY CARE PHYSICIAN: Jeronimo Sapp NP  PRIMARY CARDIOLOGIST: none      Chief Complaint:   Chief Complaint   Patient presents with    Shortness of Breath       HPI: 68y.o. year old female with a history of HTN, HLD, WES, obesity (Body mass index is 39.32 kg/m².) s/p gastric bypass in , T2DM, hypothyroidism, COPD, CAD s/p CABG in , s/p PCI to [address] in 5/15, ICM, VT, s/p AICD (Medtronic),  anxiety, and depression who presents for further evaluation of her chronic systolic heart failure. Ms. Abelardo Saeed recalls having a viral syndrome in the Fall and has not felt well since then. She uses oxygen with her BiPAP every night and sleeps on two pillows. Her activity level is diminished. She finds herself short of breath with simply talking. In walking to the clinic, she had to stop several times to catch her breath. She denies palpitations, presyncope, and syncope. She has been admitted to [facility] for further evaluation and treatment of her HFrEF.      Interrogation of AICD (Medtronic) 20:  1 episode of NSVT - 2020  Patient Activity 0.8 hour/day  Total Ventricular Pacing < 0.1%  High OptiVol Fluid Index - above threshold    History:  Past Medical History:   Diagnosis Date    Chronic obstructive pulmonary disease ([address]     Congestive heart failure ([address]     Diabetes ([address]     Hypertension     Sleep apnea      Past Surgical History:   Procedure Laterality Date    CARDIAC SURG PROCEDURE UNLIST  2018    cardiac cath - Left    HX ARTHROPLASTY  2105    knee    HX  SECTION      HX CORONARY ARTERY BYPASS GRAFT  1997    HX CORONARY STENT PLACEMENT  2016    HX HERNIA REPAIR      HX IMPLANTABLE CARDIOVERTER DEFIBRILLATOR      HX ORTHOPAEDIC      LAP GASTRIC BYPASS/KERLINE-EN-Y 2011    lap band     Social History     Socioeconomic History    Marital status:      Spouse name: Not on file    Number of children: Not on file    Years of education: Not on file    Highest education level: Not on file   Occupational History    Not on file   Social Needs    Financial resource strain: Not on file    Food insecurity:     Worry: Not on file     Inability: Not on file    Transportation needs:     Medical: Not on file     Non-medical: Not on file   Tobacco Use    Smoking status: Former Smoker     Types: Cigarettes    Smokeless tobacco: Never Used   Substance and Sexual Activity    Alcohol use: Not Currently    Drug use: Not Currently    Sexual activity: Not Currently   Lifestyle    Physical activity:     Days per week: Not on file     Minutes per session: Not on file    Stress: Not on file   Relationships    Social connections:     Talks on phone: Not on file     Gets together: Not on file     Attends Yazdanism service: Not on file     Active member of club or organization: Not on file     Attends meetings of clubs or organizations: Not on file     Relationship status: Not on file    Intimate partner violence:     Fear of current or ex partner: Not on file     Emotionally abused: Not on file     Physically abused: Not on file     Forced sexual activity: Not on file   Other Topics Concern    Not on file   Social History Narrative    Not on file     History reviewed. No pertinent family history.     Current Medications:   Current Facility-Administered Medications   Medication Dose Route Frequency Provider Last Rate Last Dose    cholecalciferol (VITAMIN D3) (1000 Units /25 mcg) tablet 2 Tab  2,000 Units Oral DAILY Rhiannon Blankenship NP   2 Tab at 01/31/20 1155    bumetanide (BUMEX) injection 2 mg  2 mg IntraVENous BID Rhiannon Blankenship NP   2 mg at 01/31/20 1155    ezetimibe (ZETIA) tablet 10 mg  10 mg Oral DAILY Rhiannon Blankenship NP        iron sucrose (VENOFER) 200 mg in 0.9% sodium chloride 100 mL IVPB  200 mg IntraVENous ONCE Polliard, Paddy Holes, NP        lidocaine (LIDODERM) 5 % patch 1 Patch  1 Patch TransDERmal Q24H Polliard, Padplacido Fernandes, NP        acetaminophen (TYLENOL) tablet 1,000 mg  1,000 mg Oral BID Ade Dillon NP        allopurinoL (ZYLOPRIM) tablet 100 mg  100 mg Oral DAILY Ade Dillon NP   100 mg at 01/31/20 2577    aspirin chewable tablet 81 mg  81 mg Oral DAILY Ade Dillon NP   81 mg at 01/31/20 7729    atorvastatin (LIPITOR) tablet 80 mg  80 mg Oral QHS Ade Dillon, NP   80 mg at 01/31/20 0022    buPROPion [facility]) tablet 150 mg  150 mg Oral DAILY Ade Dillon, NP   150 mg at 01/31/20 1537    clopidogreL (PLAVIX) tablet 75 mg  75 mg Oral DAILY Ade Dillon NP   75 mg at 01/31/20 9418    colchicine tablet 0.6 mg  0.6 mg Oral DAILY Ade Dillon NP        gabapentin (NEURONTIN) tablet 600 mg  600 mg Oral TID Ade Dillon NP   600 mg at 01/31/20 0328    levothyroxine (SYNTHROID) tablet 100 mcg  100 mcg Oral ACB Ade Dillon NP   100 mcg at 01/31/20 1294    losartan (COZAAR) tablet 50 mg  50 mg Oral DAILY Ade Dillon NP   50 mg at 01/31/20 1969    metFORMIN (GLUCOPHAGE) tablet 500 mg  500 mg Oral BID WITH MEALS Ade Dillon NP   500 mg at 01/31/20 8873    [Held by provider] metoprolol succinate (TOPROL-XL) XL tablet 25 mg  25 mg Oral DAILY Ade Dillon NP        docusate sodium (COLACE) capsule 100 mg  100 mg Oral PRN Ade Dillon NP        sodium chloride (NS) flush 5-40 mL  5-40 mL IntraVENous Q8H Ade ROSE, NP   5 mL at 01/31/20 1400    sodium chloride (NS) flush 5-40 mL  5-40 mL IntraVENous PRN Ade Dillon NP           Allergies:    Allergies   Allergen Reactions    Crestor [Rosuvastatin] Other (comments)     Causes muscle cramps    Lisinopril Cough       ROS:    General:  positive for  - fatigue  HEENT: positive for cataracts and wears glasses  Pulmonary: positive for - cough, orthopnea and shortness of breath  Cardiac: positive for dyspnea, fatigue, orthopnea, paroxysmal nocturnal dyspnea, lower extremity edema, dyspnea on exertion  GI:  positive for - constipation, nausea/vomiting and anorexia  Musculo: positive for - muscular weakness  Neuro: no TIA or stroke symptoms  Skin:  negative  Allergies: REMINDER:DOCUMENT ALLERGIES IN ALLERGY ACTIVITY  Psych: positive for - anxiety and depression    Admission Weight: Last Weight   Weight: 215 lb 13.3 oz (97.9 kg) Weight: 215 lb (97.5 kg)       Physical Exam:   Vitals:    Visit Vitals  /62 (BP 1 Location: Right arm, BP Patient Position: At rest)   Pulse 64   Temp 98 °F (36.7 °C)   Resp 14   Ht 5' 2\" (1.575 m)   Wt 215 lb (97.5 kg)   SpO2 97%   BMI 39.32 kg/m²       General:  fatigued, cooperative  HEENT: PERRLA, EOMI, Sclera clear, anicteric and Oropharynx clear, no lesions  Neck:  supple, no significant adenopathy, carotids upstroke normal bilaterally, no bruits  CVP:  10 cm  ( + ) HJR  Cardiac: regular rate, regular rhythm, S1, S2 normal, S4 present and systolic murmur present  Chest: breath sounds clear and equal bilaterally  Abdomen: soft, non-tender. Bowel sounds normal. No masses, mild hepatomegaly. Extremity: edema trace bilaterally  Neuro: Alert and oriented to person, place, and time; normal strength and tone. Normal symmetric reflexes. Normal coordination and gait  Skin:   no rashes, no ecchymoses, no petechiae    Recent Labs:   Labs Latest Ref Rng & Units 1/31/2020 1/30/2020   WBC 3.6 - 11.0 K/uL 6.4 7.9   RBC 3.80 - 5.20 M/uL 3.38(L) 3.24(L)   Hemoglobin 11.5 - 16.0 g/dL 10. 7(L) 10. 1(L)   Hematocrit 35.0 - 47.0 % 33. 7(L) 31. 9(L)   MCV 80.0 - 99.0 FL 99. 7(H) 98.5   Platelets 775 - 505 K/uL 268 263   Lymphocytes 12 - 49 % - 29   Monocytes 5 - 13 % - 6   Eosinophils 0 - 7 % - 4   Basophils 0 - 1 % - 1   Albumin 3.5 - 5.0 g/dL 3.9 3.6 Calcium 8.5 - 10.1 MG/DL 10.0 9.9   SGOT 15 - 37 U/L 28 25   Glucose 65 - 100 mg/dL 140(H) 138(H)   BUN 6 - 20 MG/DL 37(H) 40(H)   Creatinine 0.55 - 1.02 MG/DL 1.95(H) 1.98(H)   Sodium 136 - 145 mmol/L 138 137   Potassium 3.5 - 5.1 mmol/L 4.1 4.2   TSH 0.36 - 3.74 uIU/mL 1.07 -   Some recent data might be hidden        EK2020  Sinus  Rhythm, rate 67 bpm   -  Nonspecific T-abnormality. Echocardiogram:   [facility] at [facility]  LVEF 40-45% with mild lateral wall and septal wall HK  Mod LAE  Grade II diastolic dysfunction  Mod MR  Mild to mod TR with RVSP 36-45 mmHg    Cardiac Catheterization:   2018 ([facility])  Severe native 3vd  Patent LIMA-LAd, patent SVG-PDA  LVEDP 18 mmHg    5/19/15 ([facility])  Significant 3vd  Patent SVG-PDA with lesion in distal segment of graft  Patent LIMA-LAD  Mod pulm HTN  Depressed CI    PCI to distal SVG-PDA  Integrity BMS 3x9    RA 5, RV 55/11, PA 53/20/31, PCWP 11  TPG 17, /16, CO 4.17, CI 1.98      Impression / Plan:   1. ICM - Stage C, NYHA Class IIIb-IV, LVEF 30-35%   Elevated OptiVol 20    TTE  shows decrease in EF to 30-35%   Hold BB due to bradycardia    Intolerant of ACE/ARB/ARNI due to KEYONNA on CKD   Begin Hydralazine 10 mg PO TID    Continue Bumex 2 mg IV BID    Recommend LHC and RHC possibly Monday - d/w Dr. Claude Lowing    Low sodium diet   Daily weights   Strict I/O   Check Invitate - ATTR     2. CAD s/p CABG in , s/p PCI to DVG-RCA in 10/16   On ASA, statin   Hold BB due to bradycardia    Recommend [facility]    - add Zetia     3. HTN - Stage 1   Intolerant of GDMT due to KEYONNA and bradycardia   Begin hydralazine   Low sodium diet   Compliant with BiPAP    4. VT - s/p AICD   No shocks   No arrhythmias on interrogation     5. WES - on BiPAP with oxygen   Compliant with BiPAP   Follows up with her sleep medicine physician at [facility] every 6 months    6.  J2ON complicated by neuropathy   Hold metformin for upcoming LHC     Begin insulin if BG > 200    HgA1C 7.5 - DTC consult    7. Hypothyroidism   On levothyroxine 100 mcgs daily    8. Depression   On Wellbutrin  mg     9. History of Tobacco Abuse - 1/2 ppd x 20 years, quit 5 years ago   Counseled re: importance of continued abstinence    10. Gout    D/C colchicine due to KEYONNA    Continue allopurinol     11. Obesity (Body mass index is 39.32 kg/m². s/p gastric bypass in 2011    12. Osteoarthritis - s/p right TKR   Discontinue Celebrex due to CKD, HFrEF   S/p left knee injection in 8/2019   Lidocaine patches to knees     13. Hyperlipidemia    ,    Continue Lipitor 80 mg daily    Continue Zetia 10 mg PO daily   Will likely need Repatha    Low chol diet     13. Iron deficiency    Venofer 200 mg IV x 1     14. KEYONNA on CKD   Cr 2.08   D/C ARB, AA   Diurese   Avoid nephrotoxic agents    Renal consult if worse in AM     15.   Vitamin D deficiency   Continue cholecalciferol 2,000 units daily     CRISTIAN VazquezP-BC  [facility]  [address] [facility], [address] [place] [address] [address]  Office [phone]  Fax [phone] Immediate family member

## 2021-02-05 ENCOUNTER — EMERGENCY (EMERGENCY)
Facility: HOSPITAL | Age: 41
LOS: 1 days | Discharge: HOME | End: 2021-02-05
Attending: EMERGENCY MEDICINE
Payer: MEDICAID

## 2021-02-05 VITALS
SYSTOLIC BLOOD PRESSURE: 105 MMHG | HEART RATE: 89 BPM | OXYGEN SATURATION: 99 % | TEMPERATURE: 97 F | DIASTOLIC BLOOD PRESSURE: 68 MMHG | RESPIRATION RATE: 16 BRPM

## 2021-02-05 VITALS
SYSTOLIC BLOOD PRESSURE: 109 MMHG | WEIGHT: 153.44 LBS | TEMPERATURE: 97 F | HEIGHT: 64 IN | HEART RATE: 91 BPM | DIASTOLIC BLOOD PRESSURE: 79 MMHG | RESPIRATION RATE: 16 BRPM | OXYGEN SATURATION: 100 %

## 2021-02-05 LAB
ANION GAP SERPL CALC-SCNC: 8 MMOL/L — SIGNIFICANT CHANGE UP (ref 5–17)
APPEARANCE UR: CLEAR — SIGNIFICANT CHANGE UP
BASOPHILS # BLD AUTO: 0.01 K/UL — SIGNIFICANT CHANGE UP (ref 0–0.2)
BASOPHILS NFR BLD AUTO: 0.1 % — SIGNIFICANT CHANGE UP (ref 0–2)
BILIRUB UR-MCNC: NEGATIVE — SIGNIFICANT CHANGE UP
BUN SERPL-MCNC: 9 MG/DL — SIGNIFICANT CHANGE UP (ref 7–18)
CALCIUM SERPL-MCNC: 8.8 MG/DL — SIGNIFICANT CHANGE UP (ref 8.4–10.5)
CHLORIDE SERPL-SCNC: 104 MMOL/L — SIGNIFICANT CHANGE UP (ref 96–108)
CO2 SERPL-SCNC: 25 MMOL/L — SIGNIFICANT CHANGE UP (ref 22–31)
COLOR SPEC: YELLOW — SIGNIFICANT CHANGE UP
CREAT SERPL-MCNC: 0.6 MG/DL — SIGNIFICANT CHANGE UP (ref 0.5–1.3)
DIFF PNL FLD: ABNORMAL
EOSINOPHIL # BLD AUTO: 0.01 K/UL — SIGNIFICANT CHANGE UP (ref 0–0.5)
EOSINOPHIL NFR BLD AUTO: 0.1 % — SIGNIFICANT CHANGE UP (ref 0–6)
GLUCOSE SERPL-MCNC: 80 MG/DL — SIGNIFICANT CHANGE UP (ref 70–99)
GLUCOSE UR QL: NEGATIVE — SIGNIFICANT CHANGE UP
HCG SERPL-ACNC: HIGH MIU/ML
HCT VFR BLD CALC: 35.7 % — SIGNIFICANT CHANGE UP (ref 34.5–45)
HGB BLD-MCNC: 12.1 G/DL — SIGNIFICANT CHANGE UP (ref 11.5–15.5)
IMM GRANULOCYTES NFR BLD AUTO: 0.4 % — SIGNIFICANT CHANGE UP (ref 0–1.5)
KETONES UR-MCNC: NEGATIVE — SIGNIFICANT CHANGE UP
LEUKOCYTE ESTERASE UR-ACNC: ABNORMAL
LYMPHOCYTES # BLD AUTO: 2.11 K/UL — SIGNIFICANT CHANGE UP (ref 1–3.3)
LYMPHOCYTES # BLD AUTO: 27.8 % — SIGNIFICANT CHANGE UP (ref 13–44)
MCHC RBC-ENTMCNC: 29.1 PG — SIGNIFICANT CHANGE UP (ref 27–34)
MCHC RBC-ENTMCNC: 33.9 GM/DL — SIGNIFICANT CHANGE UP (ref 32–36)
MCV RBC AUTO: 85.8 FL — SIGNIFICANT CHANGE UP (ref 80–100)
MONOCYTES # BLD AUTO: 0.42 K/UL — SIGNIFICANT CHANGE UP (ref 0–0.9)
MONOCYTES NFR BLD AUTO: 5.5 % — SIGNIFICANT CHANGE UP (ref 2–14)
NEUTROPHILS # BLD AUTO: 5 K/UL — SIGNIFICANT CHANGE UP (ref 1.8–7.4)
NEUTROPHILS NFR BLD AUTO: 66.1 % — SIGNIFICANT CHANGE UP (ref 43–77)
NITRITE UR-MCNC: NEGATIVE — SIGNIFICANT CHANGE UP
NRBC # BLD: 0 /100 WBCS — SIGNIFICANT CHANGE UP (ref 0–0)
PH UR: 7 — SIGNIFICANT CHANGE UP (ref 5–8)
PLATELET # BLD AUTO: 212 K/UL — SIGNIFICANT CHANGE UP (ref 150–400)
POTASSIUM SERPL-MCNC: 3.5 MMOL/L — SIGNIFICANT CHANGE UP (ref 3.5–5.3)
POTASSIUM SERPL-SCNC: 3.5 MMOL/L — SIGNIFICANT CHANGE UP (ref 3.5–5.3)
PROT UR-MCNC: 15
RBC # BLD: 4.16 M/UL — SIGNIFICANT CHANGE UP (ref 3.8–5.2)
RBC # FLD: 12.8 % — SIGNIFICANT CHANGE UP (ref 10.3–14.5)
SODIUM SERPL-SCNC: 137 MMOL/L — SIGNIFICANT CHANGE UP (ref 135–145)
SP GR SPEC: 1.01 — SIGNIFICANT CHANGE UP (ref 1.01–1.02)
UROBILINOGEN FLD QL: NEGATIVE — SIGNIFICANT CHANGE UP
WBC # BLD: 7.58 K/UL — SIGNIFICANT CHANGE UP (ref 3.8–10.5)
WBC # FLD AUTO: 7.58 K/UL — SIGNIFICANT CHANGE UP (ref 3.8–10.5)

## 2021-02-05 PROCEDURE — 80048 BASIC METABOLIC PNL TOTAL CA: CPT

## 2021-02-05 PROCEDURE — 85025 COMPLETE CBC W/AUTO DIFF WBC: CPT

## 2021-02-05 PROCEDURE — 84702 CHORIONIC GONADOTROPIN TEST: CPT

## 2021-02-05 PROCEDURE — 76801 OB US < 14 WKS SINGLE FETUS: CPT

## 2021-02-05 PROCEDURE — 86900 BLOOD TYPING SEROLOGIC ABO: CPT

## 2021-02-05 PROCEDURE — 76817 TRANSVAGINAL US OBSTETRIC: CPT

## 2021-02-05 PROCEDURE — 99285 EMERGENCY DEPT VISIT HI MDM: CPT

## 2021-02-05 PROCEDURE — 86901 BLOOD TYPING SEROLOGIC RH(D): CPT

## 2021-02-05 PROCEDURE — 36415 COLL VENOUS BLD VENIPUNCTURE: CPT

## 2021-02-05 PROCEDURE — 81001 URINALYSIS AUTO W/SCOPE: CPT

## 2021-02-05 PROCEDURE — 76801 OB US < 14 WKS SINGLE FETUS: CPT | Mod: 26

## 2021-02-05 PROCEDURE — 76817 TRANSVAGINAL US OBSTETRIC: CPT | Mod: 26

## 2021-02-05 PROCEDURE — 86850 RBC ANTIBODY SCREEN: CPT

## 2021-02-05 PROCEDURE — 99284 EMERGENCY DEPT VISIT MOD MDM: CPT | Mod: 25

## 2021-02-05 NOTE — ED PROVIDER NOTE - CLINICAL SUMMARY MEDICAL DECISION MAKING FREE TEXT BOX
Patient with 1st trimester vaginal bleeding, no pain. Will check labs, US, reassess. DDx: ectopic, threatened

## 2021-02-05 NOTE — ED PROVIDER NOTE - NSFOLLOWUPINSTRUCTIONS_ED_ALL_ED_FT
Amenaza de aborto natural    LO QUE NECESITA SABER:    Shawanda amenaza de aborto ocurre cuando se tiene sangrado vaginal dentro de las primeras 20 semanas de embarazo. Eso indica que podría ocurrir un aborto natural. Shawanda amenaza de un aborto natural también se le conoce nishi shawanda amenaza de pérdida del embarazo.    INSTRUCCIONES SOBRE EL PUSHPA HOSPITALARIA:    Regrese a la leonor de emergencias si:  •Se siente débil o que se desmaya.      •Tiene dolor o cólicos en el abdomen o en la espalda que empeoran.      •Tiene sangrado vaginal que en shawanda hora empapa por lo menos 1 toalla sanitaria.      •Le sale un material que parece tejido o coágulos grandes.      Comuníquese con khan médico u obstetra si:  •Tiene fiebre.      •Tiene problemas para orinar, siente ardor o la necesidad de orinar con frecuencia.      •Tiene sangrado vaginal nuevo o que empeora.      •Tiene dolor o comezón vaginal o flujo vaginal de color amarillo o gin o maloliente.      •Usted tiene preguntas o inquietudes acerca de khan condición o cuidado.      Cuidados personales:Los siguientes podrían ayudar a controlar los síntomas y disminuir khan riesgo de un aborto natural:   •No se ponga nada dentro de khan vagina.No tenga relaciones sexuales, no tome duchas vaginales ni use tampones. Estas acciones pueden aumentar khan riesgo de shawanda infección o de un aborto natural.      •Repose según le indicaron.No practique ningún ejercicio ni actividades vigorosas. Estas actividades pueden causar un parto prematuro o un aborto natural. Pregunte a khan médico cuáles actividades físicas son las apropiadas para usted.      Manténgase saludable mariel el embarazo:  •Consuma alimentos saludables y variados.Los alimentos sanos pueden ayudarla a obtener las proteínas y calorías adicionales que usted necesita mariel el embarazo. Los alimentos saludables incluyen frutas, verduras, pan integral, productos lácteos bajos en grasa, frijoles, russell magras y pescado. Evite la carne y pescado crudos o que no estén cocinados completamente. Consulte con khan médico en brayan que sea necesario que siga shawanda dieta especial.      •[place] vitaminas prenatales según las indicaciones.Estas ayudan a obtener la cantidad correcta de vitaminas y minerales. También podrían ayudar a disminuir el riesgo de ciertos defectos de nacimiento.      •No consuma alcohol ni drogas ilegales.Estos pueden aumentar el riesgo de un aborto espontáneo o perjudicar al bebé.      •No fume.La nicotina y otros químicos en los cigarrillos y cigarros pueden perjudicar a khan bebé y provocar un aborto natural o un parto prematuro. Pida información a khan médico si usted actualmente fuma y necesita ayuda para dejar de fumar. Los cigarrillos electrónicos o el tabaco sin humo igualmente contienen nicotina. No use estos productos.      •Disminuya el riesgo de shawanda infección.Siempre lave caitlin josesito antes de comer o preparar alimentos. No pase tiempo con gente que está enferma. Pregúntele a khan médico si necesita vacunas nishi la vacuna contra la gripe o la hepatitis B. Las vacunas pueden disminuir khan riesgo de contraer infecciones que pueden causar un aborto espontáneo.      •Controle caitlin afecciones médicas.Mantenga bajo control khan presión arterial y azúcar en la tamara. Mantenga un peso saludable mariel el embarazo.      Programe shawanda fredi con khan obstétrico nishi se le indique:Es posible que usted deba acudir a consulta con khan ginecólogo frecuentemente para que le ordene ecografías o más exámenes de tamara. Anote caitlin preguntas para que se acuerde de hacerlas mariel caitlin visitas.       © Copyright [employer] 2021           back to top                          © Copyright [employer] 2021

## 2021-02-05 NOTE — ED ADULT TRIAGE NOTE - CHIEF COMPLAINT QUOTE
brownish colored vaginal discharge x yesterday, pt is 10 weeks pregnant, denies any abdominal pain/ discomfort, no vag bleed

## 2021-02-05 NOTE — ED ADULT NURSE NOTE - ED STAT RN HANDOFF DETAILS
Pt received from [facility], IV patent, no infiltration, no swelling, no redness, skin integrity maintained, safety maintained, v/s WDL, pt needs addressed, will continue to care for the pt.

## 2021-02-05 NOTE — ED PROVIDER NOTE - PROGRESS NOTE DETAILS
Patient is resting comfortably, NAD. I spoke to gyn PA. Dr. Ballard will come see patient. Patient is resting comfortably, NAD. Seen by Dr. Ballard, cleared for d/c. Return to the ED immediately if getting worse, not improving, or if having any new or troubling symptoms.

## 2021-02-05 NOTE — ED PROVIDER NOTE - PATIENT PORTAL LINK FT
You can access the FollowMyHealth Patient Portal offered by [facility] by registering at the following website: [email or web address]. By joining [employer]’s FollowMyHealth portal, you will also be able to view your health information using other applications (apps) compatible with our system.

## 2021-02-05 NOTE — ED PROVIDER NOTE - OBJECTIVE STATEMENT
Patient reports brown spotting since last night. approximately 10 weeks pregnant. No fever, cp, sob, ap, n/v/d, dysuria, urgency, frequency Ob-gyn: Nellie

## 2021-02-05 NOTE — ED ADULT NURSE NOTE - TEMPLATE
Progress Note (short form)





- Note


Progress Note: 


This is 40 2/7 weeks AGA born to 35 yr  via c/s due to failure to progress, 

baby cried well after birth, no active resuscitation. Apgar score 9 and 9.


Mat Hx: unremarkable





General Appearance: Yes: No Abnormalities, Well flexed, Full ROM, Spontaneous 

movements


Skin: Yes: No Abnormalities


Head: Yes: No Abnormalities,


Eyes: Yes: No Abnormalities


Ears: Yes: No Abnormalities


Nose: Yes: No Abnormalities


Mouth: Yes: No Abnormalities


Chest: Yes: No Abnormalities


Lungs/Respiratory: Yes: No Abnormalities, Clear, Bilateral good air entry


Cardiac: Yes: No Abnormalities (, normal S1/S2, no murmur)


Abdomen: Yes: No Abnormalities


Gastrointestinal: Yes: No Abnormalities


Genitalia: No Abnormalities


Genitalia, Male: Yes: Bilateral testes descended, Penis appears normal


Anus: Yes: No Abnormalities


Extremities: Yes: No Abnormalities


Spine: Yes: No Abnormalities


Reflexes: Monica: Present, Sucking: Present


Neuro: Yes: No Abnormalities, Alert, Active


Cry: No Abnormalities, Strong





Impression: well 


Plan


Routine  care. OB/GYN

## 2021-04-05 ENCOUNTER — ASOB RESULT (OUTPATIENT)
Age: 41
End: 2021-04-05

## 2021-04-05 ENCOUNTER — APPOINTMENT (OUTPATIENT)
Dept: ANTEPARTUM | Facility: CLINIC | Age: 41
End: 2021-04-05
Payer: MEDICAID

## 2021-04-05 PROCEDURE — 76817 TRANSVAGINAL US OBSTETRIC: CPT

## 2021-04-05 PROCEDURE — 99202 OFFICE O/P NEW SF 15 MIN: CPT | Mod: 25

## 2021-04-05 PROCEDURE — 76811 OB US DETAILED SNGL FETUS: CPT

## 2021-04-05 PROCEDURE — 99072 ADDL SUPL MATRL&STAF TM PHE: CPT

## 2021-04-12 ENCOUNTER — ASOB RESULT (OUTPATIENT)
Age: 41
End: 2021-04-12

## 2021-04-12 ENCOUNTER — APPOINTMENT (OUTPATIENT)
Dept: ANTEPARTUM | Facility: CLINIC | Age: 41
End: 2021-04-12
Payer: MEDICAID

## 2021-04-12 PROCEDURE — 76816 OB US FOLLOW-UP PER FETUS: CPT

## 2021-04-12 PROCEDURE — 99072 ADDL SUPL MATRL&STAF TM PHE: CPT

## 2021-04-13 ENCOUNTER — NON-APPOINTMENT (OUTPATIENT)
Age: 41
End: 2021-04-13

## 2021-05-24 ENCOUNTER — ASOB RESULT (OUTPATIENT)
Age: 41
End: 2021-05-24

## 2021-05-24 ENCOUNTER — APPOINTMENT (OUTPATIENT)
Dept: ANTEPARTUM | Facility: CLINIC | Age: 41
End: 2021-05-24
Payer: MEDICAID

## 2021-05-24 PROCEDURE — 76816 OB US FOLLOW-UP PER FETUS: CPT

## 2021-06-29 NOTE — ED ADULT NURSE NOTE - ED STAT RN HANDOFF TIME
Hospitalist Progress Note    NAME: Alberta Chaves   :  1966   MRN:  [id number]       Assessment / Plan:  Severe sepsis POA resolved  Left lower leg and foot infection with tissue necrosis in setting of chronic left lower leg ulcerated wound from trauma 2 years ago and pyoderma gangrenosum   · Lactic 2.4, heart rate 134, white count 22,000 on admission   · started on zyvox + merrem based on previous wound cx results. Day #4. Will consider narrowing abx. BCx NGTD  · continue zyvox and merrem as wound cx  with ESBL Coli, pseudomonas, enterofaecalis. Follow Bcx, NGTD  · Cont' wound care  · IV dilaudid and oxycodone prn with prn narcan. Continue methadone 140mg daily for chronic pain. Continue neurontin. Monitor respiratory status. Add bowel regimen  · s/p NS boluses and continuous IVF. · Does not want amputation at this time    · reviewed recent OP biopsy result, consistent with pyoderma gangrenosum. He was started on mycophenolate by his dermatologist about a month ago which is now on hold due to infection. Unclear if he as been treated with steroids in the past. Consulted inpatient rheumatology to explore medical treatment options for PG. Rec OP follow up  · ID following for abx needs    GI upset from recent nsaid use  GERD  --pepcid bid     HTN  --continue lisinopril     Gout flare, left hand  --continue allopurinol. S/p three doses of colchicine     Hypothyroid  --continue levothyroxine    Hx bladder cancer  Hx stroke from aneurysm with left sided weakness  --continue aspirin    Chronic methadone use: Continue home dose methadone     Obesity: BMI 31. Counseling about lifestyle modification provided        Body mass index is 31.66 kg/m². Estimated discharge date:   Barriers: Needs SNF vs IPR placemengt    Code status: Full  Prophylaxis: Lovenox  Recommended Disposition: TBD     Subjective:     Chief Complaint / Reason for Physician Visit  Still has pain in the left leg.   Does not want amputation for now. No fever or chills    Review of Systems:  Symptom Y/N Comments  Symptom Y/N Comments   Fever/Chills    Chest Pain     Poor Appetite    Edema     Cough    Abdominal Pain     Sputum    Joint Pain     SOB/BECK    Pruritis/Rash     Nausea/vomit    Tolerating PT/OT     Diarrhea    Tolerating Diet     Constipation    Other       Could NOT obtain due to:      Objective:     VITALS:   Last 24hrs VS reviewed since prior progress note. Most recent are:  Patient Vitals for the past 24 hrs:   Temp Pulse Resp BP SpO2   06/29/21 1438 98.1 °F (36.7 °C) 66 18 (!) 126/95 95 %   06/29/21 1205 97.4 °F (36.3 °C) 82 18 117/84 95 %   06/29/21 0739 97.7 °F (36.5 °C) 68 18 123/83 95 %   06/29/21 0245 97.8 °F (36.6 °C) 65 18 (!) 145/85 97 %   06/28/21 2249 97.7 °F (36.5 °C) 68 18 118/70 95 %   06/28/21 1927 97.8 °F (36.6 °C) 72 16 113/76 96 %       Intake/Output Summary (Last 24 hours) at 6/29/2021 1523  Last data filed at 6/29/2021 0246  Gross per 24 hour   Intake 1560 ml   Output 1850 ml   Net -290 ml        I had a face to face encounter and independently examined this patient on 6/29/2021, as outlined below:    PHYSICAL EXAM:  General: WD, WN. Alert, cooperative, no acute distress    EENT:  EOMI. Anicteric sclerae. MMM  Resp:  CTA bilaterally, no wheezing or rales. No accessory muscle use  CV:  Regular  rhythm,  No edema  GI:  Soft, Non distended, Non tender. +Bowel sounds  Neurologic:  Alert and oriented X 3, normal speech,   Psych:   Good insight.  Not anxious nor agitated  Skin:  Left leg: Large deep circumferential ulcer involving almost all skin below the mid sheen to the forefoot, yellowish fibrinous necrotic base with foul smelling odor, rolled out ulcer edge with minimal surrounding erythema      Reviewed most current lab test results and cultures  YES  Reviewed most current radiology test results   YES  Review and summation of old records today    NO  Reviewed patient's current orders and MAR    YES  PMH/SH reviewed - no change compared to H&P  ________________________________________________________________________  Care Plan discussed with:    Comments   Patient x    Family      RN x    Care Manager     Consultant                        Multidiciplinary team rounds were held today with , nursing, pharmacist and clinical coordinator. Patient's plan of care was discussed; medications were reviewed and discharge planning was addressed. ________________________________________________________________________  Total NON critical care TIME:  40   Minutes    Total CRITICAL CARE TIME Spent:   Minutes non procedure based      Comments   >50% of visit spent in counseling and coordination of care x    ________________________________________________________________________  Mariah Kaur MD     Procedures: see electronic medical records for all procedures/Xrays and details which were not copied into this note but were reviewed prior to creation of Plan. LABS:  I reviewed today's most current labs and imaging studies.   Pertinent labs include:  Recent Labs     06/29/21 0221 06/28/21 0212 06/27/21 0130   WBC 10.5 12.1* 11.3*   HGB 9.9* 10.7* 9.9*   HCT 30.7* 32.3* 29.5*    455* 394     Recent Labs     06/28/21 0212 06/27/21  0130   * 130*   K 4.2 4.1    102   CO2 26 22   GLU 79 102*   BUN 5* 6   CREA 0.70 0.43*   CA 8.4* 7.9*       Signed: Mariah Kaur MD 11:14

## 2021-07-09 ENCOUNTER — ASOB RESULT (OUTPATIENT)
Age: 41
End: 2021-07-09

## 2021-07-09 ENCOUNTER — APPOINTMENT (OUTPATIENT)
Dept: ANTEPARTUM | Facility: CLINIC | Age: 41
End: 2021-07-09
Payer: MEDICAID

## 2021-07-09 PROCEDURE — 76816 OB US FOLLOW-UP PER FETUS: CPT

## 2021-08-02 ENCOUNTER — APPOINTMENT (OUTPATIENT)
Dept: ANTEPARTUM | Facility: CLINIC | Age: 41
End: 2021-08-02
Payer: MEDICAID

## 2021-08-02 ENCOUNTER — ASOB RESULT (OUTPATIENT)
Age: 41
End: 2021-08-02

## 2021-08-02 PROCEDURE — 76818 FETAL BIOPHYS PROFILE W/NST: CPT

## 2021-08-02 PROCEDURE — 76816 OB US FOLLOW-UP PER FETUS: CPT

## 2021-08-09 ENCOUNTER — APPOINTMENT (OUTPATIENT)
Dept: ANTEPARTUM | Facility: CLINIC | Age: 41
End: 2021-08-09
Payer: MEDICAID

## 2021-08-09 ENCOUNTER — ASOB RESULT (OUTPATIENT)
Age: 41
End: 2021-08-09

## 2021-08-09 PROCEDURE — 76818 FETAL BIOPHYS PROFILE W/NST: CPT

## 2021-08-16 ENCOUNTER — APPOINTMENT (OUTPATIENT)
Dept: ANTEPARTUM | Facility: CLINIC | Age: 41
End: 2021-08-16
Payer: MEDICAID

## 2021-08-16 ENCOUNTER — ASOB RESULT (OUTPATIENT)
Age: 41
End: 2021-08-16

## 2021-08-16 PROCEDURE — 76818 FETAL BIOPHYS PROFILE W/NST: CPT

## 2021-08-23 ENCOUNTER — APPOINTMENT (OUTPATIENT)
Dept: ANTEPARTUM | Facility: CLINIC | Age: 41
End: 2021-08-23
Payer: MEDICAID

## 2021-08-23 ENCOUNTER — INPATIENT (INPATIENT)
Facility: HOSPITAL | Age: 41
LOS: 2 days | Discharge: HOME | End: 2021-08-26
Attending: OBSTETRICS & GYNECOLOGY | Admitting: OBSTETRICS & GYNECOLOGY
Payer: MEDICAID

## 2021-08-23 ENCOUNTER — ASOB RESULT (OUTPATIENT)
Age: 41
End: 2021-08-23

## 2021-08-23 VITALS — HEIGHT: 65 IN | WEIGHT: 171.96 LBS

## 2021-08-23 DIAGNOSIS — O26.899 OTHER SPECIFIED PREGNANCY RELATED CONDITIONS, UNSPECIFIED TRIMESTER: ICD-10-CM

## 2021-08-23 DIAGNOSIS — Z3A.00 WEEKS OF GESTATION OF PREGNANCY NOT SPECIFIED: ICD-10-CM

## 2021-08-23 DIAGNOSIS — Z34.80 ENCOUNTER FOR SUPERVISION OF OTHER NORMAL PREGNANCY, UNSPECIFIED TRIMESTER: ICD-10-CM

## 2021-08-23 LAB
APTT BLD: 29.7 SEC — SIGNIFICANT CHANGE UP (ref 27.5–35.5)
BASOPHILS # BLD AUTO: 0.01 K/UL — SIGNIFICANT CHANGE UP (ref 0–0.2)
BASOPHILS NFR BLD AUTO: 0.1 % — SIGNIFICANT CHANGE UP (ref 0–2)
BLD GP AB SCN SERPL QL: SIGNIFICANT CHANGE UP
EOSINOPHIL # BLD AUTO: 0.01 K/UL — SIGNIFICANT CHANGE UP (ref 0–0.5)
EOSINOPHIL NFR BLD AUTO: 0.1 % — SIGNIFICANT CHANGE UP (ref 0–6)
FIBRINOGEN PPP-MCNC: 780 MG/DL — HIGH (ref 290–520)
HCT VFR BLD CALC: 36.9 % — SIGNIFICANT CHANGE UP (ref 34.5–45)
HGB BLD-MCNC: 12.5 G/DL — SIGNIFICANT CHANGE UP (ref 11.5–15.5)
IMM GRANULOCYTES NFR BLD AUTO: 0.6 % — SIGNIFICANT CHANGE UP (ref 0–1.5)
INR BLD: 1.03 RATIO — SIGNIFICANT CHANGE UP (ref 0.88–1.16)
LYMPHOCYTES # BLD AUTO: 2.27 K/UL — SIGNIFICANT CHANGE UP (ref 1–3.3)
LYMPHOCYTES # BLD AUTO: 26.1 % — SIGNIFICANT CHANGE UP (ref 13–44)
MCHC RBC-ENTMCNC: 28.7 PG — SIGNIFICANT CHANGE UP (ref 27–34)
MCHC RBC-ENTMCNC: 33.9 GM/DL — SIGNIFICANT CHANGE UP (ref 32–36)
MCV RBC AUTO: 84.8 FL — SIGNIFICANT CHANGE UP (ref 80–100)
MONOCYTES # BLD AUTO: 0.54 K/UL — SIGNIFICANT CHANGE UP (ref 0–0.9)
MONOCYTES NFR BLD AUTO: 6.2 % — SIGNIFICANT CHANGE UP (ref 2–14)
NEUTROPHILS # BLD AUTO: 5.81 K/UL — SIGNIFICANT CHANGE UP (ref 1.8–7.4)
NEUTROPHILS NFR BLD AUTO: 66.9 % — SIGNIFICANT CHANGE UP (ref 43–77)
NRBC # BLD: 0 /100 WBCS — SIGNIFICANT CHANGE UP (ref 0–0)
PLATELET # BLD AUTO: 264 K/UL — SIGNIFICANT CHANGE UP (ref 150–400)
PROTHROM AB SERPL-ACNC: 12.2 SEC — SIGNIFICANT CHANGE UP (ref 10.6–13.6)
RBC # BLD: 4.35 M/UL — SIGNIFICANT CHANGE UP (ref 3.8–5.2)
RBC # FLD: 14.6 % — HIGH (ref 10.3–14.5)
SARS-COV-2 RNA SPEC QL NAA+PROBE: SIGNIFICANT CHANGE UP
WBC # BLD: 8.69 K/UL — SIGNIFICANT CHANGE UP (ref 3.8–10.5)
WBC # FLD AUTO: 8.69 K/UL — SIGNIFICANT CHANGE UP (ref 3.8–10.5)

## 2021-08-23 PROCEDURE — 76818 FETAL BIOPHYS PROFILE W/NST: CPT

## 2021-08-23 PROCEDURE — 76816 OB US FOLLOW-UP PER FETUS: CPT

## 2021-08-23 RX ORDER — OXYTOCIN 10 UNIT/ML
VIAL (ML) INJECTION
Qty: 20 | Refills: 0 | Status: DISCONTINUED | OUTPATIENT
Start: 2021-08-23 | End: 2021-08-23

## 2021-08-23 RX ORDER — SODIUM CHLORIDE 9 MG/ML
1000 INJECTION, SOLUTION INTRAVENOUS
Refills: 0 | Status: DISCONTINUED | OUTPATIENT
Start: 2021-08-23 | End: 2021-08-23

## 2021-08-23 RX ORDER — CITRIC ACID/SODIUM CITRATE 300-500 MG
15 SOLUTION, ORAL ORAL EVERY 6 HOURS
Refills: 0 | Status: DISCONTINUED | OUTPATIENT
Start: 2021-08-23 | End: 2021-08-23

## 2021-08-23 RX ORDER — OXYTOCIN 10 UNIT/ML
333.33 VIAL (ML) INJECTION
Qty: 20 | Refills: 0 | Status: DISCONTINUED | OUTPATIENT
Start: 2021-08-23 | End: 2021-08-26

## 2021-08-23 RX ORDER — CITRIC ACID/SODIUM CITRATE 300-500 MG
15 SOLUTION, ORAL ORAL EVERY 6 HOURS
Refills: 0 | Status: DISCONTINUED | OUTPATIENT
Start: 2021-08-23 | End: 2021-08-24

## 2021-08-23 RX ORDER — SODIUM CHLORIDE 9 MG/ML
1000 INJECTION, SOLUTION INTRAVENOUS
Refills: 0 | Status: DISCONTINUED | OUTPATIENT
Start: 2021-08-23 | End: 2021-08-24

## 2021-08-23 RX ADMIN — SODIUM CHLORIDE 125 MILLILITER(S): 9 INJECTION, SOLUTION INTRAVENOUS at 20:00

## 2021-08-24 ENCOUNTER — RESULT REVIEW (OUTPATIENT)
Age: 41
End: 2021-08-24

## 2021-08-24 LAB
APTT BLD: 28.3 SEC — SIGNIFICANT CHANGE UP (ref 27.5–35.5)
BASOPHILS # BLD AUTO: 0.02 K/UL — SIGNIFICANT CHANGE UP (ref 0–0.2)
BASOPHILS NFR BLD AUTO: 0.2 % — SIGNIFICANT CHANGE UP (ref 0–2)
EOSINOPHIL # BLD AUTO: 0.01 K/UL — SIGNIFICANT CHANGE UP (ref 0–0.5)
EOSINOPHIL NFR BLD AUTO: 0.1 % — SIGNIFICANT CHANGE UP (ref 0–6)
HCT VFR BLD CALC: 34.1 % — LOW (ref 34.5–45)
HGB BLD-MCNC: 11.7 G/DL — SIGNIFICANT CHANGE UP (ref 11.5–15.5)
IMM GRANULOCYTES NFR BLD AUTO: 0.8 % — SIGNIFICANT CHANGE UP (ref 0–1.5)
INR BLD: 1.09 RATIO — SIGNIFICANT CHANGE UP (ref 0.88–1.16)
LYMPHOCYTES # BLD AUTO: 1.78 K/UL — SIGNIFICANT CHANGE UP (ref 1–3.3)
LYMPHOCYTES # BLD AUTO: 13.6 % — SIGNIFICANT CHANGE UP (ref 13–44)
MCHC RBC-ENTMCNC: 28.5 PG — SIGNIFICANT CHANGE UP (ref 27–34)
MCHC RBC-ENTMCNC: 34.3 GM/DL — SIGNIFICANT CHANGE UP (ref 32–36)
MCV RBC AUTO: 83 FL — SIGNIFICANT CHANGE UP (ref 80–100)
MONOCYTES # BLD AUTO: 0.79 K/UL — SIGNIFICANT CHANGE UP (ref 0–0.9)
MONOCYTES NFR BLD AUTO: 6 % — SIGNIFICANT CHANGE UP (ref 2–14)
NEUTROPHILS # BLD AUTO: 10.36 K/UL — HIGH (ref 1.8–7.4)
NEUTROPHILS NFR BLD AUTO: 79.3 % — HIGH (ref 43–77)
NRBC # BLD: 0 /100 WBCS — SIGNIFICANT CHANGE UP (ref 0–0)
PLATELET # BLD AUTO: 193 K/UL — SIGNIFICANT CHANGE UP (ref 150–400)
PROTHROM AB SERPL-ACNC: 12.9 SEC — SIGNIFICANT CHANGE UP (ref 10.6–13.6)
RBC # BLD: 4.11 M/UL — SIGNIFICANT CHANGE UP (ref 3.8–5.2)
RBC # FLD: 14.7 % — HIGH (ref 10.3–14.5)
T PALLIDUM AB TITR SER: NEGATIVE — SIGNIFICANT CHANGE UP
WBC # BLD: 13.06 K/UL — HIGH (ref 3.8–10.5)
WBC # FLD AUTO: 13.06 K/UL — HIGH (ref 3.8–10.5)

## 2021-08-24 PROCEDURE — 88307 TISSUE EXAM BY PATHOLOGIST: CPT | Mod: 26

## 2021-08-24 RX ORDER — DIPHENHYDRAMINE HCL 50 MG
25 CAPSULE ORAL EVERY 6 HOURS
Refills: 0 | Status: DISCONTINUED | OUTPATIENT
Start: 2021-08-24 | End: 2021-08-26

## 2021-08-24 RX ORDER — BENZOCAINE 10 %
1 GEL (GRAM) MUCOUS MEMBRANE EVERY 6 HOURS
Refills: 0 | Status: DISCONTINUED | OUTPATIENT
Start: 2021-08-24 | End: 2021-08-26

## 2021-08-24 RX ORDER — OXYCODONE HYDROCHLORIDE 5 MG/1
5 TABLET ORAL
Refills: 0 | Status: DISCONTINUED | OUTPATIENT
Start: 2021-08-24 | End: 2021-08-26

## 2021-08-24 RX ORDER — LANOLIN
1 OINTMENT (GRAM) TOPICAL EVERY 6 HOURS
Refills: 0 | Status: DISCONTINUED | OUTPATIENT
Start: 2021-08-24 | End: 2021-08-26

## 2021-08-24 RX ORDER — TETANUS TOXOID, REDUCED DIPHTHERIA TOXOID AND ACELLULAR PERTUSSIS VACCINE, ADSORBED 5; 2.5; 8; 8; 2.5 [IU]/.5ML; [IU]/.5ML; UG/.5ML; UG/.5ML; UG/.5ML
0.5 SUSPENSION INTRAMUSCULAR ONCE
Refills: 0 | Status: COMPLETED | OUTPATIENT
Start: 2021-08-24 | End: 2021-08-24

## 2021-08-24 RX ORDER — TETANUS TOXOID, REDUCED DIPHTHERIA TOXOID AND ACELLULAR PERTUSSIS VACCINE, ADSORBED 5; 2.5; 8; 8; 2.5 [IU]/.5ML; [IU]/.5ML; UG/.5ML; UG/.5ML; UG/.5ML
0.5 SUSPENSION INTRAMUSCULAR ONCE
Refills: 0 | Status: COMPLETED | OUTPATIENT
Start: 2021-08-24

## 2021-08-24 RX ORDER — SENNOSIDES/DOCUSATE SODIUM 8.6MG-50MG
2 TABLET ORAL
Qty: 60 | Refills: 0
Start: 2021-08-24 | End: 2021-09-22

## 2021-08-24 RX ORDER — SODIUM CHLORIDE 9 MG/ML
3 INJECTION INTRAMUSCULAR; INTRAVENOUS; SUBCUTANEOUS EVERY 8 HOURS
Refills: 0 | Status: DISCONTINUED | OUTPATIENT
Start: 2021-08-24 | End: 2021-08-26

## 2021-08-24 RX ORDER — IBUPROFEN 200 MG
600 TABLET ORAL EVERY 6 HOURS
Refills: 0 | Status: DISCONTINUED | OUTPATIENT
Start: 2021-08-24 | End: 2021-08-26

## 2021-08-24 RX ORDER — HYDROCORTISONE 1 %
1 OINTMENT (GRAM) TOPICAL EVERY 6 HOURS
Refills: 0 | Status: DISCONTINUED | OUTPATIENT
Start: 2021-08-24 | End: 2021-08-26

## 2021-08-24 RX ORDER — ACETAMINOPHEN 500 MG
975 TABLET ORAL
Refills: 0 | Status: DISCONTINUED | OUTPATIENT
Start: 2021-08-24 | End: 2021-08-26

## 2021-08-24 RX ORDER — IBUPROFEN 200 MG
1 TABLET ORAL
Qty: 20 | Refills: 0
Start: 2021-08-24 | End: 2021-08-28

## 2021-08-24 RX ORDER — DIBUCAINE 1 %
1 OINTMENT (GRAM) RECTAL EVERY 6 HOURS
Refills: 0 | Status: DISCONTINUED | OUTPATIENT
Start: 2021-08-24 | End: 2021-08-26

## 2021-08-24 RX ORDER — AER TRAVELER 0.5 G/1
1 SOLUTION RECTAL; TOPICAL EVERY 4 HOURS
Refills: 0 | Status: DISCONTINUED | OUTPATIENT
Start: 2021-08-24 | End: 2021-08-26

## 2021-08-24 RX ORDER — KETOROLAC TROMETHAMINE 30 MG/ML
30 SYRINGE (ML) INJECTION ONCE
Refills: 0 | Status: DISCONTINUED | OUTPATIENT
Start: 2021-08-24 | End: 2021-08-26

## 2021-08-24 RX ORDER — SIMETHICONE 80 MG/1
80 TABLET, CHEWABLE ORAL EVERY 4 HOURS
Refills: 0 | Status: DISCONTINUED | OUTPATIENT
Start: 2021-08-24 | End: 2021-08-26

## 2021-08-24 RX ORDER — OXYTOCIN 10 UNIT/ML
2 VIAL (ML) INJECTION
Qty: 30 | Refills: 0 | Status: DISCONTINUED | OUTPATIENT
Start: 2021-08-24 | End: 2021-08-24

## 2021-08-24 RX ORDER — PRAMOXINE HYDROCHLORIDE 150 MG/15G
1 AEROSOL, FOAM RECTAL EVERY 4 HOURS
Refills: 0 | Status: DISCONTINUED | OUTPATIENT
Start: 2021-08-24 | End: 2021-08-26

## 2021-08-24 RX ORDER — IBUPROFEN 200 MG
600 TABLET ORAL EVERY 6 HOURS
Refills: 0 | Status: COMPLETED | OUTPATIENT
Start: 2021-08-24 | End: 2022-07-23

## 2021-08-24 RX ORDER — MAGNESIUM HYDROXIDE 400 MG/1
30 TABLET, CHEWABLE ORAL
Refills: 0 | Status: DISCONTINUED | OUTPATIENT
Start: 2021-08-24 | End: 2021-08-26

## 2021-08-24 RX ADMIN — SODIUM CHLORIDE 3 MILLILITER(S): 9 INJECTION INTRAMUSCULAR; INTRAVENOUS; SUBCUTANEOUS at 15:55

## 2021-08-24 RX ADMIN — SODIUM CHLORIDE 3 MILLILITER(S): 9 INJECTION INTRAMUSCULAR; INTRAVENOUS; SUBCUTANEOUS at 21:09

## 2021-08-24 RX ADMIN — Medication 600 MILLIGRAM(S): at 18:42

## 2021-08-24 RX ADMIN — Medication 600 MILLIGRAM(S): at 19:15

## 2021-08-24 RX ADMIN — Medication 2 MILLIUNIT(S)/MIN: at 00:25

## 2021-08-24 RX ADMIN — MAGNESIUM HYDROXIDE 30 MILLILITER(S): 400 TABLET, CHEWABLE ORAL at 21:05

## 2021-08-24 RX ADMIN — Medication 975 MILLIGRAM(S): at 21:05

## 2021-08-24 RX ADMIN — Medication 1 TABLET(S): at 12:55

## 2021-08-24 RX ADMIN — Medication 975 MILLIGRAM(S): at 21:47

## 2021-08-24 RX ADMIN — TETANUS TOXOID, REDUCED DIPHTHERIA TOXOID AND ACELLULAR PERTUSSIS VACCINE, ADSORBED 0.5 MILLILITER(S): 5; 2.5; 8; 8; 2.5 SUSPENSION INTRAMUSCULAR at 22:14

## 2021-08-24 NOTE — RAPID RESPONSE TEAM SUMMARY - NSSITUATIONBACKGROUNDRRT_GEN_ALL_CORE
40 year old female , just delivered a baby,  had normla vaginal deliver, OB/GYn was at bed side. rapid response was called for increased bleeding. medidine team arrived at bed side, checked patients vitals including blood pressure. initially SBP was noted to be in 90 repeat measurement of SBP was noted to be 120s. Patient was asked about any dizziness or palpitations . She denied alva symptoms. Code fusion was called, Patient was started with 2 prbc transfusion.

## 2021-08-24 NOTE — CHART NOTE - NSCHARTNOTEFT_GEN_A_CORE
AYSE MEADOWS EVENT NOTE     Patient seen at bedside, resting comfortably offers no new complaints. Due to ambulate, felix to be removed and due to void. pt is s/p 3 units PRBC and 1 FFP. Denies HA, CP, SOB, N/V/D, dizziness, palpitations, worsening abdominal pain, worsening vaginal bleeding, or any other concerns.    Vital Signs Last 24 Hrs  T(C): 36.6 (23 Aug 2021 14:26), Max: 36.6 (23 Aug 2021 14:26)  T(F): 97.8 (23 Aug 2021 14:26), Max: 97.8 (23 Aug 2021 14:26)  HR: 98 (23 Aug 2021 14:26) (98 - 98)  BP: 116/78 (23 Aug 2021 14:26) (116/78 - 116/78)  BP(mean): --  RR: 18 (23 Aug 2021 14:26) (18 - 18)  SpO2: 98% (23 Aug 2021 14:26) (98% - 98%)    Gen: A&O x 3, NAD  Abdomen: +BS; soft; NT; ND, fundus firm   Gyn: moderate lochia  Ext: Nontender, DTRS 2+, no worsening edema, venodynes intact                          12.5   8.69  )-----------( 264      ( 23 Aug 2021 15:36 )             36.9       A/P: 40 year old PPD #0 s/p  with PPH and code fusion due to vaginal laceration   s/p 3units PRBC and 1 unit FFP    -transfer to post partum   -4 hour CBC post transfusion   -remove felix, due to void   -Pain management as needed  -will continue to monitor  -d/w Dr. Ballard
OB PA Focused Note    s/p   c/b code fusion ebl 800cc per Dr. Ballard at bedside, instructed to give 2units [facility] 1 FFP and additional rbc rapid transfusion  anesthesia at bedside  fundus firm  moderate lochia  keep in labor room for transfusion  dw Dr. Ballard

## 2021-08-24 NOTE — LACTATION INITIAL EVALUATION - LACTATION INTERVENTIONS
Breastfeeding on cue 8-12X/24 hours with diaper count to assess for adequate intake, safe skin to skin and rooming-in encouraged./initiate/review safe skin-to-skin/reviewed benefits and recommendations for rooming in/reviewed feeding on demand/by cue at least 8 times a day

## 2021-08-25 ENCOUNTER — TRANSCRIPTION ENCOUNTER (OUTPATIENT)
Age: 41
End: 2021-08-25

## 2021-08-25 DIAGNOSIS — D62 ACUTE POSTHEMORRHAGIC ANEMIA: ICD-10-CM

## 2021-08-25 LAB
BASOPHILS # BLD AUTO: 0.03 K/UL — SIGNIFICANT CHANGE UP (ref 0–0.2)
BASOPHILS NFR BLD AUTO: 0.2 % — SIGNIFICANT CHANGE UP (ref 0–2)
EOSINOPHIL # BLD AUTO: 0.05 K/UL — SIGNIFICANT CHANGE UP (ref 0–0.5)
EOSINOPHIL NFR BLD AUTO: 0.4 % — SIGNIFICANT CHANGE UP (ref 0–6)
HCT VFR BLD CALC: 34.7 % — SIGNIFICANT CHANGE UP (ref 34.5–45)
HGB BLD-MCNC: 12 G/DL — SIGNIFICANT CHANGE UP (ref 11.5–15.5)
IMM GRANULOCYTES NFR BLD AUTO: 0.7 % — SIGNIFICANT CHANGE UP (ref 0–1.5)
LYMPHOCYTES # BLD AUTO: 2.89 K/UL — SIGNIFICANT CHANGE UP (ref 1–3.3)
LYMPHOCYTES # BLD AUTO: 24 % — SIGNIFICANT CHANGE UP (ref 13–44)
MCHC RBC-ENTMCNC: 29.2 PG — SIGNIFICANT CHANGE UP (ref 27–34)
MCHC RBC-ENTMCNC: 34.6 GM/DL — SIGNIFICANT CHANGE UP (ref 32–36)
MCV RBC AUTO: 84.4 FL — SIGNIFICANT CHANGE UP (ref 80–100)
MONOCYTES # BLD AUTO: 0.8 K/UL — SIGNIFICANT CHANGE UP (ref 0–0.9)
MONOCYTES NFR BLD AUTO: 6.6 % — SIGNIFICANT CHANGE UP (ref 2–14)
NEUTROPHILS # BLD AUTO: 8.2 K/UL — HIGH (ref 1.8–7.4)
NEUTROPHILS NFR BLD AUTO: 68.1 % — SIGNIFICANT CHANGE UP (ref 43–77)
NRBC # BLD: 0 /100 WBCS — SIGNIFICANT CHANGE UP (ref 0–0)
PLATELET # BLD AUTO: 201 K/UL — SIGNIFICANT CHANGE UP (ref 150–400)
RBC # BLD: 4.11 M/UL — SIGNIFICANT CHANGE UP (ref 3.8–5.2)
RBC # FLD: 15.6 % — HIGH (ref 10.3–14.5)
WBC # BLD: 12.05 K/UL — HIGH (ref 3.8–10.5)
WBC # FLD AUTO: 12.05 K/UL — HIGH (ref 3.8–10.5)

## 2021-08-25 PROCEDURE — 86923 COMPATIBILITY TEST ELECTRIC: CPT

## 2021-08-25 PROCEDURE — 86900 BLOOD TYPING SEROLOGIC ABO: CPT

## 2021-08-25 PROCEDURE — 36430 TRANSFUSION BLD/BLD COMPNT: CPT

## 2021-08-25 PROCEDURE — 86901 BLOOD TYPING SEROLOGIC RH(D): CPT

## 2021-08-25 PROCEDURE — 86780 TREPONEMA PALLIDUM: CPT

## 2021-08-25 PROCEDURE — 85610 PROTHROMBIN TIME: CPT

## 2021-08-25 PROCEDURE — 90715 TDAP VACCINE 7 YRS/> IM: CPT

## 2021-08-25 PROCEDURE — P9040: CPT

## 2021-08-25 PROCEDURE — 59050 FETAL MONITOR W/REPORT: CPT

## 2021-08-25 PROCEDURE — 59025 FETAL NON-STRESS TEST: CPT

## 2021-08-25 PROCEDURE — 88307 TISSUE EXAM BY PATHOLOGIST: CPT

## 2021-08-25 PROCEDURE — 85384 FIBRINOGEN ACTIVITY: CPT

## 2021-08-25 PROCEDURE — 86850 RBC ANTIBODY SCREEN: CPT

## 2021-08-25 PROCEDURE — P9011: CPT

## 2021-08-25 PROCEDURE — 86985 SPLIT BLOOD OR PRODUCTS: CPT

## 2021-08-25 PROCEDURE — 36415 COLL VENOUS BLD VENIPUNCTURE: CPT

## 2021-08-25 PROCEDURE — G0463: CPT

## 2021-08-25 PROCEDURE — 87635 SARS-COV-2 COVID-19 AMP PRB: CPT

## 2021-08-25 PROCEDURE — 85025 COMPLETE CBC W/AUTO DIFF WBC: CPT

## 2021-08-25 PROCEDURE — 85730 THROMBOPLASTIN TIME PARTIAL: CPT

## 2021-08-25 RX ORDER — IBUPROFEN 200 MG
1 TABLET ORAL
Qty: 20 | Refills: 0
Start: 2021-08-25 | End: 2021-08-29

## 2021-08-25 RX ORDER — SENNOSIDES/DOCUSATE SODIUM 8.6MG-50MG
2 TABLET ORAL
Qty: 60 | Refills: 0
Start: 2021-08-25 | End: 2021-09-23

## 2021-08-25 RX ADMIN — Medication 1 TABLET(S): at 11:30

## 2021-08-25 RX ADMIN — Medication 975 MILLIGRAM(S): at 03:12

## 2021-08-25 RX ADMIN — Medication 600 MILLIGRAM(S): at 11:30

## 2021-08-25 RX ADMIN — Medication 1 SPRAY(S): at 08:39

## 2021-08-25 RX ADMIN — SODIUM CHLORIDE 3 MILLILITER(S): 9 INJECTION INTRAMUSCULAR; INTRAVENOUS; SUBCUTANEOUS at 13:58

## 2021-08-25 RX ADMIN — Medication 600 MILLIGRAM(S): at 12:20

## 2021-08-25 RX ADMIN — MAGNESIUM HYDROXIDE 30 MILLILITER(S): 400 TABLET, CHEWABLE ORAL at 11:31

## 2021-08-25 RX ADMIN — Medication 975 MILLIGRAM(S): at 03:41

## 2021-08-25 RX ADMIN — SODIUM CHLORIDE 3 MILLILITER(S): 9 INJECTION INTRAMUSCULAR; INTRAVENOUS; SUBCUTANEOUS at 05:35

## 2021-08-25 NOTE — DISCHARGE NOTE OB - PATIENT PORTAL LINK FT
You can access the FollowMyHealth Patient Portal offered by [facility] by registering at the following website: [email or web address]. By joining [facility]’s FollowMyHealth portal, you will also be able to view your health information using other applications (apps) compatible with our system.

## 2021-08-25 NOTE — DISCHARGE NOTE OB - NS AS DC PROVIDER CONTACT Y/N MULTI
Reason For Visit  OB & Gyn Reason For Visit:   Patient presents for SIS with Ultrasound.   Consent signed and obtained.   Chaperone: RODRIGUE CISNEROS was offered a chaperone, but declined. RODRIGUE CISNEROS is accompanied by no one.          Quality  Quality:   Smoking Cessation CI no tobacco use.      History of Present Illness  General Gyne HPI: Here for SIS being done secondary to intermenstrual bleeding   Pts. age: 36 .              Active Problems   1. , spontaneous threatened (640.00) (O20.0)   2. Anxiety (300.00) (F41.9)   3. 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 Depression (311) (F32.9)   5. Encounter for gynecological examination without abnormal finding (V72.31) (Z01.419)   6. Fatigue (780.79) (R53.83)   7. History of recurrent miscarriages (N96)   8. Incomplete  (637.91) (O03.4)   9. Irregular menstrual cycle (626.4) (N92.6)   10. Missed  (632) (O02.1)   11. Retained products of conception following  (637.90) (O03.4)   12. Status post dilation and curettage (V45.89) (Z98.890)    Past Medical History   1. 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 History of herpes genitalis (V12.09) (Z86.19)   3. History of telogen effluvium (V13.89) (Z87.898)   4. History of HPV test positive (796.9,079.4)    Surgical History   1. Denied: History of Surgery    Family History   1. Denied: Family history of Diabetes Mellitus   2. Family history of hypertension (V17.49) (Z82.49) : Father   3. Family history of lung cancer (V16.1) (Z80.1) : Father, Paternal Grandfather   4. Family history of stroke (V17.1) (Z82.3) : Father, Maternal Grandfather   5. Denied: Family history of Pituitary And Hypothalamic Disorders   6. Denied: Family history of Thyroid Cancer   7. Denied: Family history of Thyroid Disorder    Social History   · Alcohol   · Denied: Drug Use   ·    · Never smoker   · Denied: Tobacco Use   · Working Full Time    OB History  OB History:   : 1.    number of miscarriages: 2.       Current Meds   1. Prenatal 27-0.8 MG Oral Tablet;   Therapy: (Recorded:05Ofr5528) to Recorded   2. Sertraline HCl - 100 MG Oral Tablet; TAKE 1 TABLET DAILY  Requested for: 24Jxd3652;   Last Rx:[id number] Ordered   3. ValACYclovir HCl - 500 MG Oral Tablet; TAKE 1 TABLET BY MOUTH EVERY DAY;   Therapy: 01Wfk8480 to (Evaluate:21Nov2017)  Requested for: 34Tem4640; Last   Rx:[id number] Ordered    Allergies   1. No Known Drug Allergies    Review  Review: past medical history problem list family medical history surgical history social history      Physical Exam    Constitutional Exam: General appearance: No acute distress, well appearing and well nourished.   External Genitalia Exam:. External Genitalia appear normal.   Urethra Exam:. Urethral Meatus appears normal.   Vaginal Exam:. vagina appears normal.   Cervix Exam: cervix appears normal.   Pulmonary Exam: Respiratory effort: No increased work of breathing or signs of respiratory distress.   Cardiovascular Exam: Examination of peripheral vasuclar system by observation is normal.   Neurologic Exam: Orientation to person, place, and time: Normal. Mood and affect: Normal.      Results/Data    18Sep2017 11:20AM   [facility] PREGNANCY TEST- URINE, IN-OFFICE   MONOCLONAL PREGNANCY: Presumptive Negative     Assessment   1. Irregular menstrual cycle (626.4) (N92.6)   2. Endometrial polyp (621.0) (N84.0)    Orders   1. [facility] PREGNANCY TEST- URINE, IN-OFFICE; [Do Not Release]; Status:Complete;   Done:   18Sep2017 11:20AM  OB & Gyn Plan:   GYN Plan:  Refer to Orders   All questions answered   SIS findings of endometrial polyp were reviewed and images shown to patient  --recommendation was made to proceed with hysteroscopic polypectomy given the intermenstrual/irregular periods along with patient's desire to maintain fertility  --we discussed the procedure along with R/B/A associated to include but not limited to anesthesia risks, infection, bleeding, uterine perforation, post op uterine  adhesion formation and fluid overload.  --pt expressed understanding of above and desires to proceed with surgery. will start scheduling process.        [ ].        Procedure  Timeout   Assistant and physician together verified correct patient.   Assistant and physician together verified correct procedure.   Assistant and physician together verified completed informed consent reviewed and accurate.   Assistant and physician together verified pertinent /relevant medical record detail reviewed.   Assistant and physician together verified site and procedure with either patient or family if patient cannot provide confirmation.   Physician marking of site if appropriate.   Physician verified correct positioning of the patient.   Assistant and physician together verified immediate availability of all necessary medication.   Assistant and physician together verified immediate availability of necessary equipment.   Assistant and physician together Verified sterility or high level disinfection of instruments/equipment prior to procedure.     Prior to start of procedure, the SIS was explained to patient and consent obtained. R/B/A were discussed. A sterile speculum was placed and the cervix brought into view. The cervix was prepped with betadine. The saline catheter was then inserted easily. The STT and speculum were removed. About 10 ml of saline were inserted into the cavity and u/s was performed. It was noted that there was a polyp, measuring about 2 cm, within the cavity near the anterior, fundal area (see report). The saline was then retracted and the syringe and catheter removed. The patient tolerated the procedure well.                Signatures   Electronically signed by : SOL Olivera; Sep 18 2017 11:49AM CST    Electronically signed by : JOSE D SNIDER M.D.; Sep 18 2017 12:48PM CST     Yes

## 2021-08-25 NOTE — DISCHARGE NOTE OB - CARE PLAN
1 Principal Discharge DX:	 (normal spontaneous vaginal delivery)  Assessment and plan of treatment:	No sex, no pushing, no heavy lifting, no strenuous activity, Nothing per vagina x  6 weeks - no sex, tampons, douching, tub baths, etc. Continue breastfeeding.  Motrin as needed for pain. Follow up in office in 5-6 weeks for post partum check up. Please call for appt.

## 2021-08-25 NOTE — DISCHARGE NOTE OB - HOSPITAL COURSE
pt admitted for jaime AMLUBNA, NRFHT, now s/p  at 39w1d c/b vaginal laceration. pt s/p 2 units prbcs, stable. post partum care and breastfeeding instructions provided. normal course of labor and delivery. covid neg

## 2021-08-25 NOTE — DISCHARGE NOTE OB - MEDICATION SUMMARY - MEDICATIONS TO TAKE
I will START or STAY ON the medications listed below when I get home from the hospital:    ibuprofen 600 mg oral tablet  -- 1 tab(s) by mouth every 6 hours, As needed, Mild pain or headache  -- Indication: For  (normal spontaneous vaginal delivery)    Prenatal Multivitamins with Folic Acid 1 mg oral tablet  -- 1 tab(s) by mouth once a day  -- Indication: For  (normal spontaneous vaginal delivery)    Analilia-Colace 50 mg-8.6 mg oral tablet  -- 2 tab(s) by mouth once a day (at bedtime)   -- Medication should be taken with plenty of water.    -- Indication: For  (normal spontaneous vaginal delivery)

## 2021-08-25 NOTE — DISCHARGE NOTE OB - REASON FOR ADMISSION
"Francisca Sr (65 y.o. Female)     Date of Birth Social Security Number Address Home Phone MRN    [id number]  [id number]   [place] [address] [phone] [id number]    Buddhism Marital Status          [facility] Single       Admission Date Admission Type Admitting Provider Attending Provider Department, Room/Bed    7/13/20 Emergency Romel Calderon MD Ray, Jonathan, MD [address], P587/1    Discharge Date Discharge Disposition Discharge Destination                       Attending Provider:  Romle Calderon MD    Allergies:  Erythromycin    Isolation:  None   Infection:  None   Code Status:  CPR    Ht:  157.5 cm (62\")   Wt:  79.4 kg (175 lb)    Admission Cmt:  None   Principal Problem:  Vertigo [R42]                 Active Insurance as of 7/13/2020     Primary Coverage     Payor Plan Insurance Group Employer/Plan Group    MEDICARE MEDICARE A & B      Payor Plan Address Payor Plan Phone Number Payor Plan Fax Number Effective Dates    [address] [phone]  4/1/2010 - None Entered    [place] [id number]       Subscriber Name Subscriber Birth Date Member ID       FRANCISCA SR 1954 [id number]                 Emergency Contacts      (Rel.) Home Phone Work Phone Mobile Phone    Tiffanie Cortes (Friend) -- -- [phone]    Jeremías Toney (Friend) -- -- [phone]            {Outbreak/Travel/Exposure Documentation......;  Question Available Choices Patient Response   Outbreak Screen: Do you currently have the following symptoms?        Fever, Cough, Runny nose, Congestion, Diarrhea, Nausea/Vomiting,Shortness of breath, Recent sudden loss of taste or smell, chills, sore throat, Muscle aches, New onset headache, No, Unknown  No (07/14/20 0655)   Outbreak Screen: In the last 14 days, have you had contact with anyone who is ill, has show any of the symptoms listed above and/or has been diagnosis with the 2019 Novel Coronavirus? This includes any " immediate household members but excludes any patients with whom you have been in contact within your normal work duties wearing proper PPE, if you are a healthcare worker.  Yes, No, Unknown              No (07/14/20 0655)   Outbreak Screen: Who was notified?    Free text  (not recorded)   Travel Screen: Have you traveled in the last month? If so, to what country have you traveled? If [place] what state? Yes, No, Unknown  List of all countries  List of all States No (07/14/20 0655)  (not recorded)  (not recorded)   Infection Risk: Do you currently have the following symptoms?  (If cough is selected, the Tuberculosis Screen is performed.) Cough, Fever, Rash, No No (07/14/20 0655)   Tuberculosis Screen: Do you have any of the following Tuberculosis Risks?  · Have you lived or spent time with anyone who had or may have TB?  · Have you lived in or visited any of the following areas for more than one month: [place], [place], [place], [place] or [place], the [place] or [place]?  · Do you have HIV/AIDS?  · Have you lived in or worked in a nursing home, homeless shelter, correctional facility, or substance abuse treatment facility?   · No    If Yes do you have any of the following symptoms? Yes responses display to the right    If Yes, symptoms listed are:  Cough greater than or equal to 3 weeks, Loss of appetite, Unexplained weight loss, Night sweats, Bloody sputum or hemoptysis, Hoarseness, Fever, Fatigue, Chest pain, No (not recorded)  (not recorded)   Exposure Screen: Have you been exposed to any of these contagious diseases in the last month? Measles, Chickenpox, Meningitis, Pertussis, Whooping Cough, No No (07/14/20 0655)        SONIA Monteiro

## 2021-08-25 NOTE — DISCHARGE NOTE OB - CARE PROVIDER_API CALL
Dwight Ballard)  Obstetrics and Gynecology  [address], [address]  [place] [address]  Phone: [phone]  Fax: [phone]  Follow Up Time:

## 2021-08-25 NOTE — PROGRESS NOTE ADULT - PROBLEM SELECTOR PLAN 1
-Continue pain management  -Encourage OOB and ambulation  -Continue current care  -Plan for discharge tomorrow  -d/w Dr. Ballard

## 2021-08-25 NOTE — DISCHARGE NOTE OB - MATERIALS PROVIDED
Vaccinations/[facility]  Screening Program/  Immunization Record/Breastfeeding Log/Breastfeeding Mother’s Support Group Information/Guide to Postpartum Care/Back To Sleep Handout/Breastfeeding Guide and Packet/Birth Certificate Instructions/Discharge Medication Information for Patients and Families Pocket Guide/Letter of Medical Neccessity/Tdap Vaccination (VIS Pub Date: 2012)

## 2021-08-25 NOTE — DISCHARGE NOTE OB - MEDICATION SUMMARY - MEDICATIONS TO STOP TAKING
I will STOP taking the medications listed below when I get home from the hospital:    meclizine 25 mg oral tablet  -- 1 tab(s) by mouth 3 times a day   -- May cause drowsiness.  Alcohol may intensify this effect.  Use care when operating dangerous machinery.    miSOPROStol 200 mcg oral tablet  -- Please take 2 tablets bucally every 3 hours for2 dose  -- Do not take this drug if you are pregnant.  It is very important that you take or use this exactly as directed.  Do not skip doses or discontinue unless directed by your doctor.  Take with food or milk.

## 2021-08-26 VITALS
DIASTOLIC BLOOD PRESSURE: 60 MMHG | RESPIRATION RATE: 18 BRPM | HEART RATE: 84 BPM | OXYGEN SATURATION: 99 % | TEMPERATURE: 98 F | SYSTOLIC BLOOD PRESSURE: 97 MMHG

## 2021-08-26 NOTE — PROGRESS NOTE ADULT - SUBJECTIVE AND OBJECTIVE BOX
Patient seen at bedside resting comfortably offers no current complaints. Ambulating and voiding without difficulty. Passing flatus and tolerating regular diet. Both breast/bottle feeding. Denies HA, CP, SOB, N/V/D, dizziness, palpitations, worsening vaginal bleeding, or any other concerns.      Vital Signs Last 24 Hrs  T(C): 36.2 (25 Aug 2021 05:31), Max: 36.7 (24 Aug 2021 13:45)  T(F): 97.2 (25 Aug 2021 05:31), Max: 98 (24 Aug 2021 13:45)  HR: 84 (25 Aug 2021 05:31) (82 - 92)  BP: 103/67 (25 Aug 2021 05:31) (100/64 - 107/63)  BP(mean): 71 (24 Aug 2021 22:47) (71 - 71)  RR: 17 (25 Aug 2021 05:31) (17 - 18)  SpO2: 99% (25 Aug 2021 05:31) (97% - 100%)    Physical Exam:   Gen: A&Ox 3, NAD  Chest: CTA B/L  Cardiac: S1,S2; RRR  Breast: Soft, nontender, nonengorged  Abdomen: +BS, Soft, nontender, ND; Fundus firm below umbilicus  Gyn: mod lochia, repaired  Ext: Nontender, DTRS 2+, no worsening edema                          12.0   12.05 )-----------( 201      ( 25 Aug 2021 07:00 )             34.7           
Patient seen at bedside resting comfortably offers no current complaints.  Ambulating and voiding without difficulty.  Passing flatus and tolerating regular diet. Pt both breast/bottle feeding.  Pt denies HA, CP, SOB, N/V/D, dizziness, palpitations, worsening abdominal pain, worsening vaginal bleeding, or any other concerns.      Vital Signs Last 24 Hrs  T(C): 36.8 (26 Aug 2021 06:20), Max: 36.8 (26 Aug 2021 06:20)  T(F): 98.3 (26 Aug 2021 06:20), Max: 98.3 (26 Aug 2021 06:20)  HR: 84 (26 Aug 2021 06:20) (84 - 87)  BP: 97/60 (26 Aug 2021 06:20) (97/60 - 112/66)  BP(mean): --  RR: 18 (26 Aug 2021 06:20) (18 - 18)  SpO2: 99% (26 Aug 2021 06:20) (99% - 100%)    Physical Exam:     Gen: A&Ox 3, NAD  Chest: CTA B/L  Cardiac: S1,S2; RRR  Breast: Soft, nontender, nonengorged  Abdomen: +BS; Soft, nontender, ND; Fundus firm below umbilicus  Gyn: Min lochia  Ext: Nontender, no worsening edema                          12.0   12.05 )-----------( 201      ( 25 Aug 2021 07:00 )             34.7

## 2021-08-26 NOTE — PROGRESS NOTE ADULT - ASSESSMENT
A/P:  PPD#1 s/p  at 39w1d c/b vaginal laceration   
A/P:  PPD#2 s/p  @ 39w1d, code fusion for vaginal laceration, s/p 3 units PRBC 1 FFP; , pt stable    - Discharge home with instructions  -Follow up in office in 5-6 weeks for postpartum visit  -Breastfeeding encouraged   -d/w Dr. Ballard

## 2022-08-15 NOTE — PATIENT PROFILE OB - PRO PATERNAL INTER NEED
PROGRESS NOTE       PATIENT NAME: Tatyana Delgado    DATE: 8/24/2022 MED REC #: [id number]  YOB: 1974   PHYSICIAN: Dillon Garcia MD           REASON FOR VISIT: Follow up right wrist extra-articular distal radius fracture, possible nondisplaced scaphoid fracture    LAST OFFICE VISIT:  7/27/2022  1. Patient is to continue to use brace for 3 weeks.   2. Patient is to work on range of motion for right wrist, 4 times a day to limit risk of reflex causalgia.  3. Follow up in 3-4 weeks, repeat xray, 2 views of the right wrist out of brace.       XRAY:  8/24/2022    SUBJECTIVE: Today Tatyana reports intermittent 6/10 burning/shooting pain with extension, pronation and supination and localized to the CMC and traveling into the forearm. She denies numbness or tingling. She denies any pain relieving measures. She states being compliant with the range of motion exercises and discontinuing the brace as instructed about 2 days ago. She is still concerned with her limitation in the upward extension of the right thumb since an incident while dressing a couple of weeks ago where she was tucking in her shirt and felt a pulling sensation in her forearm.   Since that \"pop\" sensation in her left arm and forearm, the pain she had prior at the wrist is no longer present.  Typing is not affected currently.  No numbness or tingling in the fingers.    OBJECTIVE:   Patient has no forearm atrophy, no intrinsic atrophy of the hand or wrist of either hand.    Full elbow arcs bilateral, physiologic and pain-free to provocative.    Minimal active right thumb IP extension, mild MCP extension.  Patient is sensate in the radial, median and ulnar nerve distributions of the right and left hands.    Nontender snuffbox, mildly tender at the distal radius fracture site.    XRAY  There is a healed transverse extra-articular fracture of the right distal radius, with bony spike at the radial border of the fracture.  Again I cannot appreciate  scaphoid fracture.  Radial height, inclination and tilt are all within normal limits.      ASSESSMENT & PLAN:  Closed transverse fracture of waist of scaphoid of right wrist with routine healing, subsequent encounter  (primary encounter diagnosis)  Plan: XR WRIST 2 VIEWS RIGHT, SERVICE TO OCCUPATIONAL        THERAPY    Strain of extensor pollicis longus tendon    1. Begin OT for probable right EPL strain versus rupture, with healing right distal radius fracture.  Patient may be weightbearing as tolerated to the right upper extremity without range of motion or weightbearing restrictions.  2. Referred to Dr. Ruvalcaba for possible EPL rupture, as explained clearly to the patient, that may require surgical intervention.  3. Follow up as needed with myself, as she will be under the care of Dr. Ruvalcaba.          ______________________________  Dillon Garcia MD  ORTHOPAEDICS         English

## 2022-10-11 NOTE — ED PROVIDER NOTE - CARDIOVASCULAR NEGATIVE STATEMENT, MLM
75-year-old female history of hypertension CKD anemia presenting for evaluation of anemia.  Follows with Dr. Gaston, had Venofer infusion in office today, was found to have hemoglobin of 5.9.  Sent in for further evaluation.  States that she has generalized weakness, mild shortness of breath on exertion and this is typical when she becomes anemic.  Has more recently requiring transfusions every week.  Denies bright red blood per rectum.  Symptoms gradual onset, moderate.
no chest pain and no edema.

## 2022-10-20 NOTE — RAPID RESPONSE TEAM SUMMARY - NSUNITLOCATIONRRT_GEN_ALL_CORE
ISSUE:   Everolimus level 5.9 on 10/17/2022, goal 3-5, dose 1.25 mg BID.    Cyclosporine level 53 on 10/17/22, goal , dose 50mg BID    PLAN:   Please call patient and confirm this was an accurate 12-hour trough. Verify Everolimus dose 1.25 mg BID. Confirm no new medications or illness. Confirm no missed doses. If accurate trough and accurate dose, stay on same dose of Everolimus  1.25 mg BID and repeat labs in 1 weeks    Please call pt and confirm this was an accurate 12 hour trough. Verify cyclosporine dose 50mg BID. Confirm no new medication or illness. Confirm no missed doses. If accurate trough and accurate dose, stay on same dose of cyclosporine and repeat levels in 1 week. (previous levels in range)      LPN TASK:   It looks like pt may have a lab appt in a couple days. Can you please have her repeat both levels?    Please contact patient to assess and review the plan.         3 MAT

## 2023-05-12 NOTE — DISCHARGE NOTE OB - VISION (WITH CORRECTIVE LENSES IF THE PATIENT USUALLY WEARS THEM):
Pt LVM at 10:11 AM stating she has 4 pills of Janumet left. PA has been approved for [employer]. Pt needs RX sent to pharmacy on file. Informed pt that [facility] is covered now and dr Augustin Nolasco will send a RX later today.  Pt verbalized understanding Normal vision: sees adequately in most situations; can see medication labels, newsprint

## 2023-06-07 NOTE — PATIENT PROFILE OB - WEIGHT: PREPREGNANCY IN LBS
vaginitis 03/25/2022 --    Cyst of left ovary 03/25/2022 --    Dense breast tissue on mammogram 12/09/2019 --    Dyspareunia 03/25/2022 --    Eczema --  --    GERD --  --    Graves disease --  12/06/2018 - endocrinologist thinks that her Graves disease is in remission-12/2018. 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 Rodrick Garcia Spinal Fusion    Spinal Surgery 09/29/2021 spinal fusion surgery    Tonsillectomy and Adenoidectomy 1970s with tubes in ears. - [place], [place]           Medication List   Name Date Started Instructions   desonide topical ointment 0.05 % 03/27/2023 apply sparingly and rub gently into the affected area(s) by topical route 2 times per day   fluocinonide topical cream 0.05 % 03/27/2023 apply to the affected area(s) by topical route 2 times per day   glucosamine-chondroitin oral  --    multivitamin oral  --    omeprazole 40 mg oral capsule,delayed release(DR/EC)  take 1 capsule (40 mg) by oral route once daily before a meal   Valtrex 500 mg tablet  take 1 tablet (500 mg) by oral route once daily           Allergy List   Allergen Name Date Reaction Notes   Erythromycin May 31 2013 12:00AM stomach upset 12/06/2018 -    No known environmental allergies May 31 2013 12:00AM --  NO KNOWN ENVIRONMENTAL ALLERGIES   No known food allergies May 31 2013 12:00AM --  NO KNOWN FOOD ALLERGIES   tetracycline  May 31 2013 12:00AM Hives 12/06/2018 -          Allergies Reconciled   Family Medical History   Disease Name Relative/Age Notes   Family history of breast cancer  paternal cousin   Family history of colon cancer Grandfather (paternal)/80s   --    Family history of diabetes mellitus Father/   --    Family History of Glaucoma Father/   --    Family History of Myocardial Infarction Grandmother (maternal)/   --            Reproductive History
89

## 2023-09-30 ENCOUNTER — OUTPATIENT (OUTPATIENT)
Dept: OUTPATIENT SERVICES | Facility: HOSPITAL | Age: 43
LOS: 1 days | End: 2023-09-30
Payer: MEDICAID

## 2023-09-30 DIAGNOSIS — Z3A.00 WEEKS OF GESTATION OF PREGNANCY NOT SPECIFIED: ICD-10-CM

## 2023-09-30 DIAGNOSIS — O26.899 OTHER SPECIFIED PREGNANCY RELATED CONDITIONS, UNSPECIFIED TRIMESTER: ICD-10-CM

## 2023-09-30 PROCEDURE — 76815 OB US LIMITED FETUS(S): CPT

## 2023-09-30 PROCEDURE — 76819 FETAL BIOPHYS PROFIL W/O NST: CPT | Mod: 26

## 2023-09-30 PROCEDURE — 76819 FETAL BIOPHYS PROFIL W/O NST: CPT

## 2023-09-30 PROCEDURE — 59025 FETAL NON-STRESS TEST: CPT

## 2023-09-30 PROCEDURE — 76815 OB US LIMITED FETUS(S): CPT | Mod: 26

## 2023-09-30 PROCEDURE — G0463: CPT

## 2023-10-01 ENCOUNTER — OUTPATIENT (OUTPATIENT)
Dept: OUTPATIENT SERVICES | Facility: HOSPITAL | Age: 43
LOS: 1 days | End: 2023-10-01
Payer: MEDICAID

## 2023-10-01 DIAGNOSIS — O26.899 OTHER SPECIFIED PREGNANCY RELATED CONDITIONS, UNSPECIFIED TRIMESTER: ICD-10-CM

## 2023-10-01 DIAGNOSIS — Z3A.00 WEEKS OF GESTATION OF PREGNANCY NOT SPECIFIED: ICD-10-CM

## 2023-10-01 PROCEDURE — G0463: CPT

## 2023-10-01 PROCEDURE — 59025 FETAL NON-STRESS TEST: CPT

## 2023-10-07 ENCOUNTER — OUTPATIENT (OUTPATIENT)
Dept: OUTPATIENT SERVICES | Facility: HOSPITAL | Age: 43
LOS: 1 days | End: 2023-10-07
Payer: MEDICAID

## 2023-10-07 ENCOUNTER — RESULT REVIEW (OUTPATIENT)
Age: 43
End: 2023-10-07

## 2023-10-07 DIAGNOSIS — O26.899 OTHER SPECIFIED PREGNANCY RELATED CONDITIONS, UNSPECIFIED TRIMESTER: ICD-10-CM

## 2023-10-07 DIAGNOSIS — Z3A.00 WEEKS OF GESTATION OF PREGNANCY NOT SPECIFIED: ICD-10-CM

## 2023-10-07 PROCEDURE — 59025 FETAL NON-STRESS TEST: CPT

## 2023-10-07 PROCEDURE — 76818 FETAL BIOPHYS PROFILE W/NST: CPT

## 2023-10-07 PROCEDURE — G0463: CPT

## 2023-10-07 PROCEDURE — 76818 FETAL BIOPHYS PROFILE W/NST: CPT | Mod: 26

## 2023-10-14 ENCOUNTER — OUTPATIENT (OUTPATIENT)
Dept: OUTPATIENT SERVICES | Facility: HOSPITAL | Age: 43
LOS: 1 days | End: 2023-10-14
Payer: MEDICAID

## 2023-10-14 DIAGNOSIS — Z3A.00 WEEKS OF GESTATION OF PREGNANCY NOT SPECIFIED: ICD-10-CM

## 2023-10-14 DIAGNOSIS — O26.899 OTHER SPECIFIED PREGNANCY RELATED CONDITIONS, UNSPECIFIED TRIMESTER: ICD-10-CM

## 2023-10-14 PROCEDURE — G0463: CPT

## 2023-10-14 PROCEDURE — 59025 FETAL NON-STRESS TEST: CPT

## 2023-10-15 ENCOUNTER — TRANSCRIPTION ENCOUNTER (OUTPATIENT)
Age: 43
End: 2023-10-15

## 2023-10-15 ENCOUNTER — INPATIENT (INPATIENT)
Facility: HOSPITAL | Age: 43
LOS: 1 days | Discharge: HOME | End: 2023-10-17
Attending: OBSTETRICS & GYNECOLOGY | Admitting: OBSTETRICS & GYNECOLOGY
Payer: MEDICAID

## 2023-10-15 VITALS
RESPIRATION RATE: 16 BRPM | TEMPERATURE: 98 F | DIASTOLIC BLOOD PRESSURE: 67 MMHG | SYSTOLIC BLOOD PRESSURE: 106 MMHG | HEART RATE: 71 BPM | OXYGEN SATURATION: 99 %

## 2023-10-15 DIAGNOSIS — Z3A.00 WEEKS OF GESTATION OF PREGNANCY NOT SPECIFIED: ICD-10-CM

## 2023-10-15 DIAGNOSIS — Z34.80 ENCOUNTER FOR SUPERVISION OF OTHER NORMAL PREGNANCY, UNSPECIFIED TRIMESTER: ICD-10-CM

## 2023-10-15 DIAGNOSIS — O26.899 OTHER SPECIFIED PREGNANCY RELATED CONDITIONS, UNSPECIFIED TRIMESTER: ICD-10-CM

## 2023-10-15 LAB
APTT BLD: 29.4 SEC — SIGNIFICANT CHANGE UP (ref 24.5–35.6)
BASOPHILS # BLD AUTO: 0.01 K/UL — SIGNIFICANT CHANGE UP (ref 0–0.2)
BASOPHILS NFR BLD AUTO: 0.2 % — SIGNIFICANT CHANGE UP (ref 0–2)
EOSINOPHIL # BLD AUTO: 0.04 K/UL — SIGNIFICANT CHANGE UP (ref 0–0.5)
EOSINOPHIL NFR BLD AUTO: 0.7 % — SIGNIFICANT CHANGE UP (ref 0–6)
FIBRINOGEN PPP-MCNC: 499 MG/DL — HIGH (ref 200–475)
HCT VFR BLD CALC: 37.2 % — SIGNIFICANT CHANGE UP (ref 34.5–45)
HGB BLD-MCNC: 12.5 G/DL — SIGNIFICANT CHANGE UP (ref 11.5–15.5)
IMM GRANULOCYTES NFR BLD AUTO: 0.3 % — SIGNIFICANT CHANGE UP (ref 0–0.9)
INR BLD: 0.94 RATIO — SIGNIFICANT CHANGE UP (ref 0.85–1.18)
LYMPHOCYTES # BLD AUTO: 1.84 K/UL — SIGNIFICANT CHANGE UP (ref 1–3.3)
LYMPHOCYTES # BLD AUTO: 30.6 % — SIGNIFICANT CHANGE UP (ref 13–44)
MCHC RBC-ENTMCNC: 28.5 PG — SIGNIFICANT CHANGE UP (ref 27–34)
MCHC RBC-ENTMCNC: 33.6 GM/DL — SIGNIFICANT CHANGE UP (ref 32–36)
MCV RBC AUTO: 84.9 FL — SIGNIFICANT CHANGE UP (ref 80–100)
MONOCYTES # BLD AUTO: 0.32 K/UL — SIGNIFICANT CHANGE UP (ref 0–0.9)
MONOCYTES NFR BLD AUTO: 5.3 % — SIGNIFICANT CHANGE UP (ref 2–14)
NEUTROPHILS # BLD AUTO: 3.79 K/UL — SIGNIFICANT CHANGE UP (ref 1.8–7.4)
NEUTROPHILS NFR BLD AUTO: 62.9 % — SIGNIFICANT CHANGE UP (ref 43–77)
NRBC # BLD: 0 /100 WBCS — SIGNIFICANT CHANGE UP (ref 0–0)
PLATELET # BLD AUTO: 202 K/UL — SIGNIFICANT CHANGE UP (ref 150–400)
PROTHROM AB SERPL-ACNC: 10.7 SEC — SIGNIFICANT CHANGE UP (ref 9.5–13)
RBC # BLD: 4.38 M/UL — SIGNIFICANT CHANGE UP (ref 3.8–5.2)
RBC # FLD: 14.8 % — HIGH (ref 10.3–14.5)
WBC # BLD: 6.02 K/UL — SIGNIFICANT CHANGE UP (ref 3.8–10.5)
WBC # FLD AUTO: 6.02 K/UL — SIGNIFICANT CHANGE UP (ref 3.8–10.5)

## 2023-10-15 RX ORDER — ACETAMINOPHEN 500 MG
2 TABLET ORAL
Qty: 40 | Refills: 0
Start: 2023-10-15 | End: 2023-10-19

## 2023-10-15 RX ORDER — BENZOCAINE 10 %
1 GEL (GRAM) MUCOUS MEMBRANE EVERY 6 HOURS
Refills: 0 | Status: DISCONTINUED | OUTPATIENT
Start: 2023-10-15 | End: 2023-10-17

## 2023-10-15 RX ORDER — KETOROLAC TROMETHAMINE 30 MG/ML
30 SYRINGE (ML) INJECTION ONCE
Refills: 0 | Status: DISCONTINUED | OUTPATIENT
Start: 2023-10-15 | End: 2023-10-17

## 2023-10-15 RX ORDER — PRAMOXINE HYDROCHLORIDE 150 MG/15G
1 AEROSOL, FOAM RECTAL EVERY 4 HOURS
Refills: 0 | Status: DISCONTINUED | OUTPATIENT
Start: 2023-10-15 | End: 2023-10-17

## 2023-10-15 RX ORDER — SODIUM CHLORIDE 9 MG/ML
1000 INJECTION, SOLUTION INTRAVENOUS
Refills: 0 | Status: DISCONTINUED | OUTPATIENT
Start: 2023-10-15 | End: 2023-10-15

## 2023-10-15 RX ORDER — SIMETHICONE 80 MG/1
80 TABLET, CHEWABLE ORAL EVERY 4 HOURS
Refills: 0 | Status: DISCONTINUED | OUTPATIENT
Start: 2023-10-15 | End: 2023-10-17

## 2023-10-15 RX ORDER — TETANUS TOXOID, REDUCED DIPHTHERIA TOXOID AND ACELLULAR PERTUSSIS VACCINE, ADSORBED 5; 2.5; 8; 8; 2.5 [IU]/.5ML; [IU]/.5ML; UG/.5ML; UG/.5ML; UG/.5ML
0.5 SUSPENSION INTRAMUSCULAR ONCE
Refills: 0 | Status: DISCONTINUED | OUTPATIENT
Start: 2023-10-15 | End: 2023-10-17

## 2023-10-15 RX ORDER — ACETAMINOPHEN 500 MG
975 TABLET ORAL EVERY 6 HOURS
Refills: 0 | Status: DISCONTINUED | OUTPATIENT
Start: 2023-10-15 | End: 2023-10-17

## 2023-10-15 RX ORDER — IBUPROFEN 200 MG
1 TABLET ORAL
Qty: 20 | Refills: 0
Start: 2023-10-15 | End: 2023-10-19

## 2023-10-15 RX ORDER — CITRIC ACID/SODIUM CITRATE 300-500 MG
15 SOLUTION, ORAL ORAL EVERY 6 HOURS
Refills: 0 | Status: DISCONTINUED | OUTPATIENT
Start: 2023-10-15 | End: 2023-10-15

## 2023-10-15 RX ORDER — MAGNESIUM HYDROXIDE 400 MG/1
30 TABLET, CHEWABLE ORAL
Refills: 0 | Status: DISCONTINUED | OUTPATIENT
Start: 2023-10-15 | End: 2023-10-17

## 2023-10-15 RX ORDER — OXYTOCIN 10 UNIT/ML
41.67 VIAL (ML) INJECTION
Qty: 20 | Refills: 0 | Status: DISCONTINUED | OUTPATIENT
Start: 2023-10-15 | End: 2023-10-17

## 2023-10-15 RX ORDER — DIPHENHYDRAMINE HCL 50 MG
25 CAPSULE ORAL EVERY 6 HOURS
Refills: 0 | Status: DISCONTINUED | OUTPATIENT
Start: 2023-10-15 | End: 2023-10-17

## 2023-10-15 RX ORDER — HYDROCORTISONE 1 %
1 OINTMENT (GRAM) TOPICAL EVERY 6 HOURS
Refills: 0 | Status: DISCONTINUED | OUTPATIENT
Start: 2023-10-15 | End: 2023-10-17

## 2023-10-15 RX ORDER — LANOLIN
1 OINTMENT (GRAM) TOPICAL EVERY 6 HOURS
Refills: 0 | Status: DISCONTINUED | OUTPATIENT
Start: 2023-10-15 | End: 2023-10-17

## 2023-10-15 RX ORDER — FERROUS SULFATE 325(65) MG
1 TABLET ORAL
Qty: 30 | Refills: 0
Start: 2023-10-15 | End: 2023-11-13

## 2023-10-15 RX ORDER — IBUPROFEN 200 MG
600 TABLET ORAL EVERY 6 HOURS
Refills: 0 | Status: COMPLETED | OUTPATIENT
Start: 2023-10-15 | End: 2024-09-12

## 2023-10-15 RX ORDER — SODIUM CHLORIDE 9 MG/ML
3 INJECTION INTRAMUSCULAR; INTRAVENOUS; SUBCUTANEOUS EVERY 8 HOURS
Refills: 0 | Status: DISCONTINUED | OUTPATIENT
Start: 2023-10-15 | End: 2023-10-17

## 2023-10-15 RX ORDER — DIBUCAINE 1 %
1 OINTMENT (GRAM) RECTAL EVERY 6 HOURS
Refills: 0 | Status: DISCONTINUED | OUTPATIENT
Start: 2023-10-15 | End: 2023-10-17

## 2023-10-15 RX ORDER — CHLORHEXIDINE GLUCONATE 213 G/1000ML
1 SOLUTION TOPICAL DAILY
Refills: 0 | Status: DISCONTINUED | OUTPATIENT
Start: 2023-10-15 | End: 2023-10-15

## 2023-10-15 RX ORDER — OXYCODONE HYDROCHLORIDE 5 MG/1
5 TABLET ORAL
Refills: 0 | Status: DISCONTINUED | OUTPATIENT
Start: 2023-10-15 | End: 2023-10-17

## 2023-10-15 RX ORDER — OXYTOCIN 10 UNIT/ML
VIAL (ML) INJECTION
Qty: 30 | Refills: 0 | Status: DISCONTINUED | OUTPATIENT
Start: 2023-10-15 | End: 2023-10-15

## 2023-10-15 RX ORDER — DOCUSATE SODIUM 100 MG
1 CAPSULE ORAL
Qty: 30 | Refills: 0
Start: 2023-10-15 | End: 2023-11-13

## 2023-10-15 RX ORDER — AER TRAVELER 0.5 G/1
1 SOLUTION RECTAL; TOPICAL EVERY 4 HOURS
Refills: 0 | Status: DISCONTINUED | OUTPATIENT
Start: 2023-10-15 | End: 2023-10-17

## 2023-10-15 RX ORDER — TRANEXAMIC ACID 100 MG/ML
1000 INJECTION, SOLUTION INTRAVENOUS ONCE
Refills: 0 | Status: COMPLETED | OUTPATIENT
Start: 2023-10-15 | End: 2023-10-15

## 2023-10-15 RX ADMIN — Medication 975 MILLIGRAM(S): at 22:40

## 2023-10-15 RX ADMIN — TRANEXAMIC ACID 220 MILLIGRAM(S): 100 INJECTION, SOLUTION INTRAVENOUS at 16:06

## 2023-10-15 RX ADMIN — SODIUM CHLORIDE 3 MILLILITER(S): 9 INJECTION INTRAMUSCULAR; INTRAVENOUS; SUBCUTANEOUS at 22:20

## 2023-10-15 RX ADMIN — Medication 2 MILLIUNIT(S)/MIN: at 13:28

## 2023-10-15 RX ADMIN — Medication 125 MILLIUNIT(S)/MIN: at 16:06

## 2023-10-15 RX ADMIN — Medication 975 MILLIGRAM(S): at 21:40

## 2023-10-15 NOTE — DISCHARGE NOTE OB - CARE PLAN
1 Principal Discharge DX:	 (normal spontaneous vaginal delivery)  Assessment and plan of treatment:	routine vaginal delivery care, no tub baths, douching or sex for 6weeks, ambulate daily   follow up in 5-6wks with own obgyn   Principal Discharge DX:	 (normal spontaneous vaginal delivery)  Assessment and plan of treatment:	routine vaginal delivery care, no tub baths, douching or sex for 6weeks, ambulate daily   follow up in 5-6wks with own obgyn  Secondary Diagnosis:	PPH (postpartum hemorrhage)  Assessment and plan of treatment:	resolved   Principal Discharge DX:	 (normal spontaneous vaginal delivery)  Assessment and plan of treatment:	routine vaginal delivery care, no tub baths, douching or sex for 6weeks, ambulate daily   follow up in 5-6wks with own obgyn  Secondary Diagnosis:	PPH (postpartum hemorrhage)  Assessment and plan of treatment:	resolved  Secondary Diagnosis:	Anemia due to acute blood loss  Assessment and plan of treatment:	take iron, folic acid, vitamin C, and prenatal vitamins. eat iron fortified food   Principal Discharge DX:	 (normal spontaneous vaginal delivery)  Assessment and plan of treatment:	No sex, no pushing, no heavy lifting, no strenuous activity, Nothing per vagina x  6 weeks - no sex, tampons, douching, tub baths, etc. Continue breastfeeding.  Motrin as needed for pain. Follow up in office in 5-6 weeks for post partum check up. Please call for appt.  Secondary Diagnosis:	PPH (postpartum hemorrhage)  Assessment and plan of treatment:	resolved  Secondary Diagnosis:	Anemia due to acute blood loss  Assessment and plan of treatment:	take iron, folic acid, vitamin C, and prenatal vitamins. eat iron fortified food

## 2023-10-15 NOTE — DISCHARGE NOTE OB - CARE PROVIDER_API CALL
Dwight Ballard.  Obstetrics and Gynecology  [address], [address]  [place] [address]  Phone: [phone]  Fax: [phone]  Follow Up Time: 1 month

## 2023-10-15 NOTE — DISCHARGE NOTE OB - HOSPITAL COURSE
pt presented in early labor  s/p    normal pp course pt presented in early labor  s/p  c/b pph   normal pp course

## 2023-10-15 NOTE — DISCHARGE NOTE OB - MATERIALS PROVIDED
Breastfeeding Log/Bottle Feeding Log/Guide to Postpartum Care/Shaken Baby Prevention Handout/Breastfeeding Guide and Packet

## 2023-10-15 NOTE — DISCHARGE NOTE OB - PRO FEEDING PLAN INFANT OB
Performing Laboratory: [id number] Performing Laboratory: [id number] initiation of breastfeeding/breast milk feeding

## 2023-10-15 NOTE — DISCHARGE NOTE OB - PLAN OF CARE
routine vaginal delivery care, no tub baths, douching or sex for 6weeks, ambulate daily   follow up in 5-6wks with own obgyn resolved take iron, folic acid, vitamin C, and prenatal vitamins. eat iron fortified food No sex, no pushing, no heavy lifting, no strenuous activity, Nothing per vagina x  6 weeks - no sex, tampons, douching, tub baths, etc. Continue breastfeeding.  Motrin as needed for pain. Follow up in office in 5-6 weeks for post partum check up. Please call for appt.

## 2023-10-15 NOTE — PATIENT PROFILE OB - FALL HARM RISK - UNIVERSAL INTERVENTIONS
Bed in lowest position, wheels locked, appropriate side rails in place/Call bell, personal items and telephone in reach/Instruct patient to call for assistance before getting out of bed or chair/Non-slip footwear when patient is out of bed/[place] to call system/Physically safe environment - no spills, clutter or unnecessary equipment/Purposeful Proactive Rounding/Room/bathroom lighting operational, light cord in reach
2

## 2023-10-15 NOTE — DISCHARGE NOTE OB - PATIENT PORTAL LINK FT
You can access the FollowMyHealth Patient Portal offered by [facility] by registering at the following website: [email or web address]. By joining [employer]’s FollowMyHealth portal, you will also be able to view your health information using other applications (apps) compatible with our system.

## 2023-10-15 NOTE — DISCHARGE NOTE OB - MEDICATION SUMMARY - MEDICATIONS TO TAKE
I will START or STAY ON the medications listed below when I get home from the hospital:    ibuprofen 600 mg oral tablet  -- 1 tab(s) by mouth every 6 hours as needed for  moderate pain  -- Indication: For pain    Tylenol Extra Strength 500 mg oral tablet  -- 2 tab(s) by mouth every 6 hours as needed for  moderate pain  -- Indication: For pain    PreNatal Low Iron oral tablet  -- 1 tab(s) by mouth once a day  -- Indication: For Supplementation    ferrous sulfate 325 mg (65 mg elemental iron) oral tablet  -- 1 tab(s) by mouth once a day  -- Indication: For anemai    Colace 100 mg oral capsule  -- 1 cap(s) by mouth once a day  -- Indication: For constipation   I will START or STAY ON the medications listed below when I get home from the hospital:    ibuprofen 600 mg oral tablet  -- 1 tab(s) by mouth every 6 hours as needed for  moderate pain  -- Indication: For pain    Tylenol Extra Strength 500 mg oral tablet  -- 2 tab(s) by mouth every 6 hours as needed for  moderate pain  -- Indication: For pain    PreNatal Low Iron oral tablet  -- 1 tab(s) by mouth once a day  -- Indication: For Supplementation    ferrous sulfate 325 mg (65 mg elemental iron) oral tablet  -- 1 tab(s) by mouth once a day  -- Indication: For for anemia    Colace 100 mg oral capsule  -- 1 cap(s) by mouth once a day  -- Indication: For constipation    ascorbic acid 500 mg oral tablet  -- 1 tab(s) by mouth once a day  -- Indication: For for anemia

## 2023-10-16 LAB
BASOPHILS # BLD AUTO: 0.02 K/UL — SIGNIFICANT CHANGE UP (ref 0–0.2)
BASOPHILS # BLD AUTO: 0.02 K/UL — SIGNIFICANT CHANGE UP (ref 0–0.2)
BASOPHILS NFR BLD AUTO: 0.2 % — SIGNIFICANT CHANGE UP (ref 0–2)
BASOPHILS NFR BLD AUTO: 0.2 % — SIGNIFICANT CHANGE UP (ref 0–2)
EOSINOPHIL # BLD AUTO: 0.01 K/UL — SIGNIFICANT CHANGE UP (ref 0–0.5)
EOSINOPHIL # BLD AUTO: 0.04 K/UL — SIGNIFICANT CHANGE UP (ref 0–0.5)
EOSINOPHIL NFR BLD AUTO: 0.1 % — SIGNIFICANT CHANGE UP (ref 0–6)
EOSINOPHIL NFR BLD AUTO: 0.5 % — SIGNIFICANT CHANGE UP (ref 0–6)
HCT VFR BLD CALC: 28.1 % — LOW (ref 34.5–45)
HCT VFR BLD CALC: 29.6 % — LOW (ref 34.5–45)
HGB BLD-MCNC: 10.3 G/DL — LOW (ref 11.5–15.5)
HGB BLD-MCNC: 9.7 G/DL — LOW (ref 11.5–15.5)
IMM GRANULOCYTES NFR BLD AUTO: 0.3 % — SIGNIFICANT CHANGE UP (ref 0–0.9)
IMM GRANULOCYTES NFR BLD AUTO: 0.5 % — SIGNIFICANT CHANGE UP (ref 0–0.9)
LYMPHOCYTES # BLD AUTO: 1.21 K/UL — SIGNIFICANT CHANGE UP (ref 1–3.3)
LYMPHOCYTES # BLD AUTO: 14 % — SIGNIFICANT CHANGE UP (ref 13–44)
LYMPHOCYTES # BLD AUTO: 2.08 K/UL — SIGNIFICANT CHANGE UP (ref 1–3.3)
LYMPHOCYTES # BLD AUTO: 24 % — SIGNIFICANT CHANGE UP (ref 13–44)
MCHC RBC-ENTMCNC: 29.1 PG — SIGNIFICANT CHANGE UP (ref 27–34)
MCHC RBC-ENTMCNC: 29.3 PG — SIGNIFICANT CHANGE UP (ref 27–34)
MCHC RBC-ENTMCNC: 34.5 GM/DL — SIGNIFICANT CHANGE UP (ref 32–36)
MCHC RBC-ENTMCNC: 34.8 GM/DL — SIGNIFICANT CHANGE UP (ref 32–36)
MCV RBC AUTO: 84.3 FL — SIGNIFICANT CHANGE UP (ref 80–100)
MCV RBC AUTO: 84.4 FL — SIGNIFICANT CHANGE UP (ref 80–100)
MONOCYTES # BLD AUTO: 0.38 K/UL — SIGNIFICANT CHANGE UP (ref 0–0.9)
MONOCYTES # BLD AUTO: 0.55 K/UL — SIGNIFICANT CHANGE UP (ref 0–0.9)
MONOCYTES NFR BLD AUTO: 4.4 % — SIGNIFICANT CHANGE UP (ref 2–14)
MONOCYTES NFR BLD AUTO: 6.4 % — SIGNIFICANT CHANGE UP (ref 2–14)
NEUTROPHILS # BLD AUTO: 5.92 K/UL — SIGNIFICANT CHANGE UP (ref 1.8–7.4)
NEUTROPHILS # BLD AUTO: 7.02 K/UL — SIGNIFICANT CHANGE UP (ref 1.8–7.4)
NEUTROPHILS NFR BLD AUTO: 68.4 % — SIGNIFICANT CHANGE UP (ref 43–77)
NEUTROPHILS NFR BLD AUTO: 81 % — HIGH (ref 43–77)
NRBC # BLD: 0 /100 WBCS — SIGNIFICANT CHANGE UP (ref 0–0)
NRBC # BLD: 0 /100 WBCS — SIGNIFICANT CHANGE UP (ref 0–0)
PLATELET # BLD AUTO: 176 K/UL — SIGNIFICANT CHANGE UP (ref 150–400)
PLATELET # BLD AUTO: 179 K/UL — SIGNIFICANT CHANGE UP (ref 150–400)
RBC # BLD: 3.33 M/UL — LOW (ref 3.8–5.2)
RBC # BLD: 3.51 M/UL — LOW (ref 3.8–5.2)
RBC # FLD: 14.6 % — HIGH (ref 10.3–14.5)
RBC # FLD: 14.6 % — HIGH (ref 10.3–14.5)
T PALLIDUM AB TITR SER: NEGATIVE — SIGNIFICANT CHANGE UP
WBC # BLD: 8.65 K/UL — SIGNIFICANT CHANGE UP (ref 3.8–10.5)
WBC # BLD: 8.67 K/UL — SIGNIFICANT CHANGE UP (ref 3.8–10.5)
WBC # FLD AUTO: 8.65 K/UL — SIGNIFICANT CHANGE UP (ref 3.8–10.5)
WBC # FLD AUTO: 8.67 K/UL — SIGNIFICANT CHANGE UP (ref 3.8–10.5)

## 2023-10-16 PROCEDURE — 86900 BLOOD TYPING SEROLOGIC ABO: CPT

## 2023-10-16 PROCEDURE — 86780 TREPONEMA PALLIDUM: CPT

## 2023-10-16 PROCEDURE — 85730 THROMBOPLASTIN TIME PARTIAL: CPT

## 2023-10-16 PROCEDURE — 86850 RBC ANTIBODY SCREEN: CPT

## 2023-10-16 PROCEDURE — 59025 FETAL NON-STRESS TEST: CPT

## 2023-10-16 PROCEDURE — 59050 FETAL MONITOR W/REPORT: CPT

## 2023-10-16 PROCEDURE — 36415 COLL VENOUS BLD VENIPUNCTURE: CPT

## 2023-10-16 PROCEDURE — 85025 COMPLETE CBC W/AUTO DIFF WBC: CPT

## 2023-10-16 PROCEDURE — 85610 PROTHROMBIN TIME: CPT

## 2023-10-16 PROCEDURE — 85384 FIBRINOGEN ACTIVITY: CPT

## 2023-10-16 PROCEDURE — 86923 COMPATIBILITY TEST ELECTRIC: CPT

## 2023-10-16 PROCEDURE — 86901 BLOOD TYPING SEROLOGIC RH(D): CPT

## 2023-10-16 PROCEDURE — G0463: CPT

## 2023-10-16 RX ORDER — IBUPROFEN 200 MG
600 TABLET ORAL EVERY 6 HOURS
Refills: 0 | Status: DISCONTINUED | OUTPATIENT
Start: 2023-10-16 | End: 2023-10-17

## 2023-10-16 RX ORDER — ASCORBIC ACID 60 MG
1 TABLET,CHEWABLE ORAL
Qty: 30 | Refills: 0
Start: 2023-10-16 | End: 2023-11-14

## 2023-10-16 RX ADMIN — Medication 975 MILLIGRAM(S): at 09:03

## 2023-10-16 RX ADMIN — Medication 975 MILLIGRAM(S): at 10:34

## 2023-10-16 RX ADMIN — Medication 600 MILLIGRAM(S): at 13:19

## 2023-10-16 RX ADMIN — Medication 600 MILLIGRAM(S): at 12:19

## 2023-10-16 RX ADMIN — Medication 1 TABLET(S): at 11:39

## 2023-10-16 RX ADMIN — SODIUM CHLORIDE 3 MILLILITER(S): 9 INJECTION INTRAMUSCULAR; INTRAVENOUS; SUBCUTANEOUS at 14:30

## 2023-10-16 RX ADMIN — Medication 600 MILLIGRAM(S): at 22:48

## 2023-10-16 RX ADMIN — Medication 1 SPRAY(S): at 05:17

## 2023-10-16 RX ADMIN — Medication 600 MILLIGRAM(S): at 23:15

## 2023-10-16 RX ADMIN — SODIUM CHLORIDE 3 MILLILITER(S): 9 INJECTION INTRAMUSCULAR; INTRAVENOUS; SUBCUTANEOUS at 23:11

## 2023-10-16 RX ADMIN — SODIUM CHLORIDE 3 MILLILITER(S): 9 INJECTION INTRAMUSCULAR; INTRAVENOUS; SUBCUTANEOUS at 06:10

## 2023-10-17 VITALS
TEMPERATURE: 97 F | DIASTOLIC BLOOD PRESSURE: 62 MMHG | HEART RATE: 78 BPM | OXYGEN SATURATION: 97 % | RESPIRATION RATE: 17 BRPM | SYSTOLIC BLOOD PRESSURE: 98 MMHG

## 2023-10-17 RX ADMIN — SODIUM CHLORIDE 3 MILLILITER(S): 9 INJECTION INTRAMUSCULAR; INTRAVENOUS; SUBCUTANEOUS at 06:26

## 2023-10-17 NOTE — PROGRESS NOTE ADULT - SUBJECTIVE AND OBJECTIVE BOX
OBGYN PA Note  Patient seen at bedside resting comfortably offers no current complaints. Pt breastfeeding  at bedside. Ambulating and voiding without difficulty. Passing flatus and tolerating regular diet.  Exclusively breast feeding . Denies HA, CP, SOB, N/V/D, dizziness, palpitations,  worsening vaginal bleeding, or any other concerns.      Vital Signs Last 24 Hrs  T(C): 36.7 (16 Oct 2023 05:35), Max: 36.8 (15 Oct 2023 22:17)  T(F): 98.1 (16 Oct 2023 05:35), Max: 98.2 (15 Oct 2023 22:17)  HR: 61 (16 Oct 2023 05:35) (61 - 87)  BP: 100/61 (16 Oct 2023 05:35) (100/61 - 128/87)  BP(mean): --  RR: 17 (16 Oct 2023 05:35) (16 - 18)  SpO2: 97% (16 Oct 2023 05:35) (96% - 100%)    Parameters below as of 16 Oct 2023 05:35  Patient On (Oxygen Delivery Method): room air        Physical Exam:     Gen: A&Ox 3, NAD  Chest: CTA B/L  Cardiac: S1,S2; RRR  Breast: Soft, nontender, nonengorged  Abdomen: +BS, Soft, nontender, ND; Fundus firm below umbilicus  Gyn: mod lochia  Ext: Nontender, no worsening edema                          9.7    8.65  )-----------( 176      ( 16 Oct 2023 06:23 )             28.1           
Patient seen at bedside resting comfortably offers no current complaints.  Ambulating and voiding without difficulty.  Passing flatus and tolerating regular diet.  both breast/bottle feeding.  Denies HA, CP, SOB, N/V/D, dizziness, palpitations, worsening abdominal pain, worsening vaginal bleeding, or any other concerns.    Vital Signs Last 24 Hrs  T(C): 36.3 (17 Oct 2023 06:39), Max: 37.1 (17 Oct 2023 02:00)  T(F): 97.4 (17 Oct 2023 06:39), Max: 98.7 (17 Oct 2023 02:00)  HR: 78 (17 Oct 2023 06:39) (75 - 94)  BP: 98/62 (17 Oct 2023 06:39) (97/65 - 110/71)  BP(mean): --  RR: 17 (17 Oct 2023 06:39) (16 - 17)  SpO2: 97% (17 Oct 2023 06:39) (97% - 98%)    Parameters below as of 17 Oct 2023 06:39  Patient On (Oxygen Delivery Method): room air        Physical Exam:     Gen: A&Ox 3, NAD  Chest: CTA B/L  Cardiac: S1+S2; RRR  Breast: Soft, nontender, nonengorged  Abdomen: +BS; Soft, nontender, ND; Fundus firm below umbilicus  Gyn: Min lochia  Ext: Nontender, DTRS 2+, no worsening edema                           10.3   8.67  )-----------( 179      ( 16 Oct 2023 10:17 )             29.6       A/P: 42 year old PPD#2 s/p , pph    - Discharge home with instructions  -Follow up in office in 5-6 weeks for postpartum visit  -Breastfeeding encouraged   -d/w dr. adame

## 2023-10-17 NOTE — PROGRESS NOTE ADULT - PROBLEM SELECTOR PLAN 2
- Discharge home with instructions  -Follow up in office in 5-6 weeks for postpartum visit  -Breastfeeding encouraged   -d/w dr. adame

## 2023-10-17 NOTE — PROGRESS NOTE ADULT - ASSESSMENT
A/P:  PPD#1 s/p  c/b PPH and acute blood loss anemia. Pt is stable.  -Continue pain management  -Encourage OOB and ambulation  - cont iron and vit c  - f/up lochia  -CBC wnl  -Continue current care  -Plan for discharge tomorrow  -d/w dr [place]
A/P: 42 year old PPD#2 s/p , pph

## 2023-10-17 NOTE — LACTATION INITIAL EVALUATION - LACTATION INTERVENTIONS
Breastfeeding on cue 8-12X/24 hours with diaper count to assess for adequate intake, safe skin to skin and rooming-in encouraged./initiate/review safe skin-to-skin/initiate/review hand expression/initiate/review techniques for position and latch/review techniques to manage sore nipples/engorgement/reviewed components of an effective feeding and at least 8 effective feedings per day required/reviewed importance of monitoring infant diapers, the breastfeeding log, and minimum output each day/reviewed benefits and recommendations for rooming in/reviewed feeding on demand/by cue at least 8 times a day
Reinforced benefits of  exclusive breastfeeding for first 6 months and provided with breastfeeding community resource list for breastfeeding support/assistance available post-discharge and of importance of early f/u with pediatrician within 2-3 days.  Reviewed safe positioning and latch techniques; Declined Referral to [facility] Tele-lactation program for f/u post-discharge breastfeeding assessment/initiate/review safe skin-to-skin/initiate/review hand expression/initiate/review techniques for position and latch/post discharge community resources provided/review techniques to increase milk supply/review techniques to manage sore nipples/engorgement/reviewed components of an effective feeding and at least 8 effective feedings per day required/reviewed importance of monitoring infant diapers, the breastfeeding log, and minimum output each day/reviewed benefits and recommendations for rooming in/reviewed feeding on demand/by cue at least 8 times a day/reviewed indications of inadequate milk transfer that would require supplementation

## 2023-11-13 NOTE — ED ADULT NURSE NOTE - NS ED NURSE LEVEL OF CONSCIOUSNESS MENTAL STATUS
Awake
1) Continued minced/moist diet per SLP recommendations  2) Recommend ensure plus HP BID (350kcal, 20gm protein per 8 fl oz serving), d/c ensure pudding (no longer available)  3) Continue MVI and Vit C 500mg daily  4) Monitor po intake, diet tolerance, weight trends, labs, GI function, skin integrity

## 2024-01-16 NOTE — ED PROVIDER NOTE - CHIEF COMPLAINT
Ranjit Vanegas MD   to Ranjit Vanegas Onc Nurse  Blu         1/16/24 10:08 AM   Please give the patient a call to collect more details of her symptoms:  -When did it start?  -What areas involved?  -other associated symptoms especially neuro deficits?  -anything can alleviate or/and worsen the symptoms?    I think she might need to be seen in office within the next couple of days if symptoms stable. ER referral if acute symptoms/worsening symptoms.    Thank you!  Ranjit   The patient is a 37y Female complaining of chest pain.

## 2024-02-23 NOTE — ED ADULT NURSE NOTE - NSFALLRSKPASTHIST_ED_ALL_ED
Spoke to pt who needed Mounjaro 10 mg and we found it at [facility] Location. Their staff said they will mail it between Mon and Tuesday of next week.  Pt said that would be fine.   no

## 2024-09-26 NOTE — ED ADULT NURSE NOTE - NS ED NURSE LEVEL OF CONSCIOUSNESS SPEECH
Visit Discharge/Physician Orders     Discharge condition: Stable     Assessment of pain at discharge:yes      Anesthetic used: 4% Lido Soln     Discharge to: Home     Left via:Private automobile     Accompanied by: accompanied by family     ECF/HHA: [facility]  (ok for kerlix and coban when supply of Profore runs out-*must be wrapped from base of 5th toe to just below the knee*)  *New order*      Dressing Orders: LEFT LEG WOUND (BLOCKED; LATERAL TO POSTERIOR): Cleanse with normal saline, apply Eliana, ABD pad and  profore wrap. Change M-W(@[facility])/F     Treatment Orders: FOLLOW NUTRITIOUS DIET. CHOOSE FOODS HIGH IN PROTEIN -CHICKEN- FISH-AND EGGS,  CHOOSE FOODS HIGH IN VITAMIN C.   MULTIVITAMIN DAILY.       STOP SMOKING     WBAT to Left Heel      Velcro compression- [place]      [facility] followup visit :1 week ______________________________  (Please note your next appointment above and if you are unable to keep, kindly give a 24 hour notice. Thank you.)     Physician signature:__________________________      If you experience any of the following, please call the Wound Care Center during business hours:     * Increase in Pain  * Temperature over 101  * Increase in drainage from your wound  * Drainage with a foul odor  * Bleeding  * Increase in swelling  * Need for compression bandage changes due to slippage, breakthrough drainage.     If you need medical attention outside of the business hours of the Wound Care Centers plea        
Speaking Coherently

## 2024-12-27 NOTE — PATIENT PROFILE OB - NUTRITION PROFILE
PA for LACTULOSE APPROVED     Date(s) approved anuary 1, 2024 to December 27, 2025     Case #    Patient advised by          []MyChart Message  [x]Phone call   []LMOM  []L/M to call office as no active Communication consent on file  []Unable to leave detailed message as VM not approved on Communication consent       Pharmacy advised by    [x]Fax  []Phone call    Approval letter scanned into Media Yes      No indicators present

## 2025-01-15 ENCOUNTER — EMERGENCY (EMERGENCY)
Facility: HOSPITAL | Age: 45
LOS: 1 days | Discharge: HOME | End: 2025-01-15
Attending: EMERGENCY MEDICINE
Payer: MEDICAID

## 2025-01-15 VITALS
WEIGHT: 154.76 LBS | HEART RATE: 75 BPM | RESPIRATION RATE: 16 BRPM | SYSTOLIC BLOOD PRESSURE: 128 MMHG | DIASTOLIC BLOOD PRESSURE: 86 MMHG | OXYGEN SATURATION: 99 % | TEMPERATURE: 98 F | HEIGHT: 65 IN

## 2025-01-15 PROCEDURE — 99283 EMERGENCY DEPT VISIT LOW MDM: CPT

## 2025-01-15 RX ORDER — IBUPROFEN 200 MG
600 TABLET ORAL ONCE
Refills: 0 | Status: COMPLETED | OUTPATIENT
Start: 2025-01-15 | End: 2025-01-15

## 2025-01-15 RX ORDER — IBUPROFEN 200 MG
1 TABLET ORAL
Qty: 9 | Refills: 0
Start: 2025-01-15 | End: 2025-01-17

## 2025-01-15 RX ADMIN — Medication 600 MILLIGRAM(S): at 17:29

## 2025-02-12 ENCOUNTER — EMERGENCY (EMERGENCY)
Facility: HOSPITAL | Age: 45
LOS: 1 days | Discharge: HOME | End: 2025-02-12
Attending: EMERGENCY MEDICINE
Payer: MEDICAID

## 2025-02-12 VITALS
RESPIRATION RATE: 18 BRPM | DIASTOLIC BLOOD PRESSURE: 67 MMHG | TEMPERATURE: 99 F | WEIGHT: 218.92 LBS | HEART RATE: 118 BPM | OXYGEN SATURATION: 96 % | HEIGHT: 65 IN | SYSTOLIC BLOOD PRESSURE: 104 MMHG

## 2025-02-12 VITALS
HEART RATE: 113 BPM | OXYGEN SATURATION: 96 % | TEMPERATURE: 99 F | RESPIRATION RATE: 18 BRPM | DIASTOLIC BLOOD PRESSURE: 67 MMHG | SYSTOLIC BLOOD PRESSURE: 109 MMHG

## 2025-02-12 LAB
ALBUMIN SERPL ELPH-MCNC: 4 G/DL — SIGNIFICANT CHANGE UP (ref 3.5–5)
ALP SERPL-CCNC: 100 U/L — SIGNIFICANT CHANGE UP (ref 40–120)
ALT FLD-CCNC: 23 U/L DA — SIGNIFICANT CHANGE UP (ref 10–60)
ANION GAP SERPL CALC-SCNC: 5 MMOL/L — SIGNIFICANT CHANGE UP (ref 5–17)
APPEARANCE UR: CLEAR — SIGNIFICANT CHANGE UP
AST SERPL-CCNC: 20 U/L — SIGNIFICANT CHANGE UP (ref 10–40)
BASOPHILS # BLD AUTO: 0.01 K/UL — SIGNIFICANT CHANGE UP (ref 0–0.2)
BASOPHILS NFR BLD AUTO: 0.1 % — SIGNIFICANT CHANGE UP (ref 0–2)
BILIRUB SERPL-MCNC: 0.5 MG/DL — SIGNIFICANT CHANGE UP (ref 0.2–1.2)
BILIRUB UR-MCNC: NEGATIVE — SIGNIFICANT CHANGE UP
BUN SERPL-MCNC: 13 MG/DL — SIGNIFICANT CHANGE UP (ref 7–18)
CALCIUM SERPL-MCNC: 8.9 MG/DL — SIGNIFICANT CHANGE UP (ref 8.4–10.5)
CHLORIDE SERPL-SCNC: 103 MMOL/L — SIGNIFICANT CHANGE UP (ref 96–108)
CO2 SERPL-SCNC: 27 MMOL/L — SIGNIFICANT CHANGE UP (ref 22–31)
COLOR SPEC: YELLOW — SIGNIFICANT CHANGE UP
CREAT SERPL-MCNC: 0.76 MG/DL — SIGNIFICANT CHANGE UP (ref 0.5–1.3)
DIFF PNL FLD: NEGATIVE — SIGNIFICANT CHANGE UP
EGFR: 98 ML/MIN/1.73M2 — SIGNIFICANT CHANGE UP
EOSINOPHIL # BLD AUTO: 0 K/UL — SIGNIFICANT CHANGE UP (ref 0–0.5)
EOSINOPHIL NFR BLD AUTO: 0 % — SIGNIFICANT CHANGE UP (ref 0–6)
GLUCOSE SERPL-MCNC: 117 MG/DL — HIGH (ref 70–99)
GLUCOSE UR QL: NEGATIVE MG/DL — SIGNIFICANT CHANGE UP
HCG SERPL-ACNC: <1 MIU/ML — SIGNIFICANT CHANGE UP
HCT VFR BLD CALC: 42.6 % — SIGNIFICANT CHANGE UP (ref 34.5–45)
HGB BLD-MCNC: 13.9 G/DL — SIGNIFICANT CHANGE UP (ref 11.5–15.5)
IMM GRANULOCYTES NFR BLD AUTO: 0.1 % — SIGNIFICANT CHANGE UP (ref 0–0.9)
KETONES UR-MCNC: NEGATIVE MG/DL — SIGNIFICANT CHANGE UP
LEUKOCYTE ESTERASE UR-ACNC: NEGATIVE — SIGNIFICANT CHANGE UP
LIDOCAIN IGE QN: 32 U/L — SIGNIFICANT CHANGE UP (ref 13–75)
LYMPHOCYTES # BLD AUTO: 0.76 K/UL — LOW (ref 1–3.3)
LYMPHOCYTES # BLD AUTO: 9.7 % — LOW (ref 13–44)
MCHC RBC-ENTMCNC: 27.3 PG — SIGNIFICANT CHANGE UP (ref 27–34)
MCHC RBC-ENTMCNC: 32.6 G/DL — SIGNIFICANT CHANGE UP (ref 32–36)
MCV RBC AUTO: 83.7 FL — SIGNIFICANT CHANGE UP (ref 80–100)
MONOCYTES # BLD AUTO: 0.46 K/UL — SIGNIFICANT CHANGE UP (ref 0–0.9)
MONOCYTES NFR BLD AUTO: 5.9 % — SIGNIFICANT CHANGE UP (ref 2–14)
NEUTROPHILS # BLD AUTO: 6.59 K/UL — SIGNIFICANT CHANGE UP (ref 1.8–7.4)
NEUTROPHILS NFR BLD AUTO: 84.2 % — HIGH (ref 43–77)
NITRITE UR-MCNC: NEGATIVE — SIGNIFICANT CHANGE UP
NRBC BLD AUTO-RTO: 0 /100 WBCS — SIGNIFICANT CHANGE UP (ref 0–0)
PH UR: 7.5 — SIGNIFICANT CHANGE UP (ref 5–8)
PLATELET # BLD AUTO: 247 K/UL — SIGNIFICANT CHANGE UP (ref 150–400)
POTASSIUM SERPL-MCNC: 3.7 MMOL/L — SIGNIFICANT CHANGE UP (ref 3.5–5.3)
POTASSIUM SERPL-SCNC: 3.7 MMOL/L — SIGNIFICANT CHANGE UP (ref 3.5–5.3)
PROT SERPL-MCNC: 8.2 G/DL — SIGNIFICANT CHANGE UP (ref 6–8.3)
PROT UR-MCNC: NEGATIVE MG/DL — SIGNIFICANT CHANGE UP
RBC # BLD: 5.09 M/UL — SIGNIFICANT CHANGE UP (ref 3.8–5.2)
RBC # FLD: 13.4 % — SIGNIFICANT CHANGE UP (ref 10.3–14.5)
SODIUM SERPL-SCNC: 135 MMOL/L — SIGNIFICANT CHANGE UP (ref 135–145)
SP GR SPEC: 1.01 — SIGNIFICANT CHANGE UP (ref 1–1.03)
UROBILINOGEN FLD QL: 0.2 MG/DL — SIGNIFICANT CHANGE UP (ref 0.2–1)
WBC # BLD: 7.83 K/UL — SIGNIFICANT CHANGE UP (ref 3.8–10.5)
WBC # FLD AUTO: 7.83 K/UL — SIGNIFICANT CHANGE UP (ref 3.8–10.5)

## 2025-02-12 PROCEDURE — 80053 COMPREHEN METABOLIC PANEL: CPT

## 2025-02-12 PROCEDURE — 81003 URINALYSIS AUTO W/O SCOPE: CPT

## 2025-02-12 PROCEDURE — 85025 COMPLETE CBC W/AUTO DIFF WBC: CPT

## 2025-02-12 PROCEDURE — 99284 EMERGENCY DEPT VISIT MOD MDM: CPT

## 2025-02-12 PROCEDURE — 36415 COLL VENOUS BLD VENIPUNCTURE: CPT

## 2025-02-12 PROCEDURE — 84702 CHORIONIC GONADOTROPIN TEST: CPT

## 2025-02-12 PROCEDURE — 96374 THER/PROPH/DIAG INJ IV PUSH: CPT

## 2025-02-12 PROCEDURE — 83690 ASSAY OF LIPASE: CPT

## 2025-02-12 PROCEDURE — 99284 EMERGENCY DEPT VISIT MOD MDM: CPT | Mod: 25

## 2025-02-12 RX ORDER — BACTERIOSTATIC SODIUM CHLORIDE 0.9 %
1000 VIAL (ML) INJECTION ONCE
Refills: 0 | Status: COMPLETED | OUTPATIENT
Start: 2025-02-12 | End: 2025-02-12

## 2025-02-12 RX ORDER — ONDANSETRON 4 MG/1
4 TABLET, ORALLY DISINTEGRATING ORAL ONCE
Refills: 0 | Status: COMPLETED | OUTPATIENT
Start: 2025-02-12 | End: 2025-02-12

## 2025-02-12 RX ORDER — ONDANSETRON 4 MG/1
1 TABLET, ORALLY DISINTEGRATING ORAL
Qty: 12 | Refills: 0
Start: 2025-02-12

## 2025-02-12 RX ADMIN — Medication 1000 MILLILITER(S): at 02:40

## 2025-02-12 RX ADMIN — ONDANSETRON 4 MILLIGRAM(S): 4 TABLET, ORALLY DISINTEGRATING ORAL at 02:40

## 2025-02-12 NOTE — ED PROVIDER NOTE - NSFOLLOWUPINSTRUCTIONS_ED_ALL_ED_FT
Return to ER immediately if abdominal pain or any pain does not improve, worsens, or persists, fever, vomiting,  blood in stools or having black stools, unable to eat or drink, worsening/persistent diarrhea or constipation, any concerns. See your doctor as soon as possible (within 1-2 days).   If you need further assistance for appointments you can contact the [facility] Care Coordinator at [phone]. In addition our outpatient [facility] is located at [address] [place] [address], tele: [phone].

## 2025-02-12 NOTE — ED ADULT NURSE NOTE - OBJECTIVE STATEMENT
PT coming in for abdo pain, N + V, ongoign for several days.  Other members of the family sick with same, some chest discomfort with coughing but not pain, no SOB, no SS of acute distress ntoed.

## 2025-02-12 NOTE — ED PROVIDER NOTE - CLINICAL SUMMARY MEDICAL DECISION MAKING FREE TEXT BOX
410am- Pt feels better, has no guarding to repeat abdominal palpation, able to eat and drink without vomiting.   Pt is well appearing, has no new complaints and able to walk with normal gait. Pt is stable for discharge and follow up with medical doctor. Pt educated on care and need for follow up. Discussed anticipatory guidance and return precautions. Questions answered. I had a detailed discussion with the patient regarding the historical points, exam findings, and any diagnostic results supporting the discharge diagnosis.

## 2025-02-12 NOTE — ED PROVIDER NOTE - PATIENT PORTAL LINK FT
You can access the FollowMyHealth Patient Portal offered by [facility] by registering at the following website: [email or web address]. By joining [employer]’s FollowMyHealth portal, you will also be able to view your health information using other applications (apps) compatible with our system.

## 2025-02-12 NOTE — ED PROVIDER NOTE - ENMT, MLM
Airway patent, Nasal mucosa clear. Mouth with normal mucosa. Throat has no vesicles, no oropharyngeal exudates and uvula is midline. 70

## 2025-02-12 NOTE — ED ADULT NURSE NOTE - CHIEF COMPLAINT QUOTE
Pt c/o stomach pain with nausea and vomiting denies diarrhea. pt stated her older daughter had the same symptoms started Sunday night.

## 2025-02-12 NOTE — ED PROVIDER NOTE - OBJECTIVE STATEMENT
44-year-old female chief complaint of nausea and vomiting since yesterday.  No reported fever, denies abdominal pain on evaluation.  No urinary symptoms.  No chest pain, no shortness of breath. no diarrhea, no melena, no blood per rectum.   Patient has infant daughter with similar symptoms of vomiting.

## 2025-02-12 NOTE — ED PROVIDER NOTE - NSFOLLOWUPCLINICS_GEN_ALL_ED_FT
[facility]  Internal Medicine  [address] [place] [address]  Phone: [phone]  Fax: [phone]